# Patient Record
Sex: MALE | Race: WHITE | Employment: OTHER | URBAN - METROPOLITAN AREA
[De-identification: names, ages, dates, MRNs, and addresses within clinical notes are randomized per-mention and may not be internally consistent; named-entity substitution may affect disease eponyms.]

---

## 2017-01-20 RX ORDER — GABAPENTIN 300 MG/1
CAPSULE ORAL
Qty: 90 CAP | Refills: 1 | Status: SHIPPED | OUTPATIENT
Start: 2017-01-20 | End: 2017-07-15 | Stop reason: SDUPTHER

## 2017-01-30 ENCOUNTER — OFFICE VISIT (OUTPATIENT)
Dept: NEUROLOGY | Age: 67
End: 2017-01-30

## 2017-01-30 VITALS
DIASTOLIC BLOOD PRESSURE: 76 MMHG | TEMPERATURE: 98.5 F | SYSTOLIC BLOOD PRESSURE: 122 MMHG | HEART RATE: 60 BPM | RESPIRATION RATE: 20 BRPM | HEIGHT: 72 IN | BODY MASS INDEX: 33.62 KG/M2 | WEIGHT: 248.2 LBS | OXYGEN SATURATION: 98 %

## 2017-01-30 DIAGNOSIS — G20 PARKINSON DISEASE (HCC): Primary | ICD-10-CM

## 2017-01-30 DIAGNOSIS — E11.40 NEUROPATHY DUE TO TYPE 2 DIABETES MELLITUS (HCC): ICD-10-CM

## 2017-01-30 NOTE — MR AVS SNAPSHOT
Visit Information Date & Time Provider Department Dept. Phone Encounter #  
 1/30/2017  3:00 PM Ml Wilkerson MD Neurology [facility] [phone] [id number] Follow-up Instructions Return in about 6 months (around 7/30/2017). Upcoming Health Maintenance Date Due Hepatitis C Screening 1950 HEMOGLOBIN A1C Q6M 1948 LIPID PANEL Q1 1948 FOOT EXAM Q1 7/19/1958 MICROALBUMIN Q1 7/19/1958 EYE EXAM RETINAL OR DILATED Q1 7/19/1958 DTaP/Tdap/Td series (1 - Tdap) 7/19/1969 FOBT Q 1 YEAR AGE 50-75 7/19/1998 ZOSTER VACCINE AGE 60> 7/19/2008 GLAUCOMA SCREENING Q2Y 7/19/2013 Pneumococcal 65+ Low/Medium Risk (1 of 2 - PCV13) 7/19/2013 MEDICARE YEARLY EXAM 7/19/2013 INFLUENZA AGE 9 TO ADULT 8/1/2016 Allergies as of 1/30/2017  Review Complete On: 1/30/2017 By: Ml Wilkerson MD  
 No Known Allergies Current Immunizations  Never Reviewed No immunizations on file. Not reviewed this visit You Were Diagnosed With   
  
 Codes Comments [facility] disease [facility]    -  Primary ICD-10-CM: G20 
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  
 33.66 kg/m 2 2.39 m 2 Preferred Pharmacy Pharmacy Name Phone [id number] [phone] , [id number] [place] [address]  [phone] Your Updated Medication List  
  
   
This list is accurate as of: 1/30/17  3:43 PM.  Always use your most recent med list.  
  
  
  
  
 aspirin delayed-release 81 mg tablet Take 81 mg by mouth daily. atenolol 50 mg tablet Commonly known as:  TENORMIN Take 50 mg by mouth daily. BENADRYL 25 mg capsule Generic drug:  diphenhydrAMINE Take 25 mg by mouth daily (with lunch). carbidopa-levodopa  mg per tablet Commonly known as:  SINEMET  
TAKE 1 TABLET BY MOUTH 4 TIMES A DAY  
  
 fluticasone 50 mcg/actuation nasal spray Commonly known as:  Elihue Caller 2 Sprays by Both Nostrils route daily. gabapentin 300 mg capsule Commonly known as:  NEURONTIN  
TAKE ONE CAPSULE BY MOUTH AT BEDTIME  
  
 glipiZIDE 5 mg tablet Commonly known as:  Selestine Gibbs Take 5 mg by mouth Daily (before breakfast). LIPITOR 40 mg tablet Generic drug:  atorvastatin Take 40 mg by mouth daily (with dinner). metFORMIN 500 mg tablet Commonly known as:  GLUCOPHAGE Take 500 mg by mouth three (3) times daily (with meals). MOBIC 15 mg tablet Generic drug:  meloxicam  
Take 15 mg by mouth daily. multivits,Stress Formula-Zinc tablet Take 1 Tab by mouth daily. OSTEO BI-FLEX PO Take 1 Tab by mouth daily. PREVALITE 4 gram packet Generic drug:  cholestyramine light Dissolve one packet in 2-6 ounces of water or juice daily PriLOSEC OTC 20 mg tablet Generic drug:  omeprazole Take 20 mg by mouth daily. VITAMIN D2 50,000 unit capsule Generic drug:  ergocalciferol Take 50,000 Units by mouth. Takes on Friday XARELTO 20 mg Tab tablet Generic drug:  rivaroxaban  
20 mg daily (with dinner). Take one tablet by mouth daily  Indications: DEEP VENOUS THROMBOSIS Follow-up Instructions Return in about 6 months (around 7/30/2017). Patient Instructions PRESCRIPTION REFILL POLICY [facility] Statement to Patients April 1, 2014 In an effort to ensure the large volume of patient prescription refills is processed in the most efficient and expeditious manner, we are asking our patients to assist us by calling your Pharmacy for all prescription refills, this will include also your  Mail Order Pharmacy. The pharmacy will contact our office electronically to continue the refill process. Please do not wait until the last minute to call your pharmacy. We need at least 48 hours (2days) to fill prescriptions. We also encourage you to call your pharmacy before going to  your prescription to make sure it is ready. With regard to controlled substance prescription refill requests (narcotic refills) that need to be picked up at our office, we ask your cooperation by providing us with at least 72 hours (3days) notice that you will need a refill. We will not refill narcotic prescription refill requests after 4:00pm on any weekday, Monday through Thursday, or after 2:00pm on Fridays, or on the weekends. We encourage everyone to explore another way of getting your prescription refill request processed using [employer], our patient web portal through our electronic medical record system. [employer] is an efficient and effective way to communicate your medication request directly to the office and  downloadable as an jude on your smart phone . [employer] also features a review functionality that allows you to view your medication list as well as leave messages for your physician. Are you ready to get connected? If so please review the attatched instructions or speak to any of our staff to get you set up right away! Thank you so much for your cooperation. Should you have any questions please contact our Practice Administrator. The Physicians and Staff,  [facility] Ofelia Mccloud [address] What is a living will? A living will is a legal form you use to write down the kind of care you want at the end of your life. It is used by the health professionals who will treat you if you aren't able to decide for yourself. If you put your wishes in writing, your loved ones and others will know what kind of care you want. They won't need to guess. This can ease your mind and be helpful to others. A living will is not the same as an estate or property will. An estate will explains what you want to happen with your money and property after you die. Is a living will a legal document? A living will is a legal document. Each state has its own laws about living medrano. If you move to another state, make sure that your living will is legal in the state where you now live. Or you might use a universal form that has been approved by many states. This kind of form can sometimes be completed and stored online. Your electronic copy will then be available wherever you have a connection to the Internet. In most cases, doctors will respect your wishes even if you have a form from a different state. · You don't need an  to complete a living will. But legal advice can be helpful if your state's laws are unclear, your health history is complicated, or your family can't agree on what should be in your living will. · You can change your living will at any time. Some people find that their wishes about end-of-life care change as their health changes. · In addition to making a living will, think about completing a medical power of  form. This form lets you name the person you want to make end-of-life treatment decisions for you (your \"health care agent\") if you're not able to. Many hospitals and nursing homes will give you the forms you need to complete a living will and a medical power of . · Your living will is used only if you can't make or communicate decisions for yourself anymore. If you become able to make decisions again, you can accept or refuse any treatment, no matter what you wrote in your living will. · Your state may offer an online registry.  This is a place where you can store your living will online so the doctors and nurses who need to treat you can find it right away. What should you think about when creating a living will? Talk about your end-of-life wishes with your family members and your doctor. Let them know what you want. That way the people making decisions for you won't be surprised by your choices. Think about these questions as you make your living will: · Do you know enough about life support methods that might be used? If not, talk to your doctor so you know what might be done if you can't breathe on your own, your heart stops, or you're unable to swallow. · What things would you still want to be able to do after you receive life-support methods? Would you want to be able to walk? To speak? To eat on your own? To live without the help of machines? · If you have a choice, where do you want to be cared for? In your home? At a hospital or nursing home? · Do you want certain Yazidism practices performed if you become very ill? · If you have a choice at the end of your life, where would you prefer to die? At home? In a hospital or nursing home? Somewhere else? · Would you prefer to be buried or cremated? · Do you want your organs to be donated after you die? What should you do with your living will? · Make sure that your family members and your health care agent have copies of your living will. · Give your doctor a copy of your living will to keep in your medical record. If you have more than one doctor, make sure that each one has a copy. · You may want to put a copy of your living will where it can be easily found. Where can you learn more? Go to http://cameron-jaye.info/. Enter Q816 in the search box to learn more about \"Learning About Living [place]. \" Current as of: February 24, 2016 Content Version: 11.1 © 0937-2240 [employer].  Care instructions adapted under license by [address] (which disclaims liability or warranty for this information). If you have questions about a medical condition or this instruction, always ask your healthcare professional. [address] any warranty or liability for your use of this information. Patient appears to be doing great and stay as safely and completely busy as possible. Keep blood sugars as reasonably and safely tight as possible. Revisit 6 months. Introducing [facility]! [place] introduces [employer] patient portal. Now you can access parts of your medical record, email your doctor's office, and request medication refills online. 1. In your internet browser, go to [email or web address] 2. Click on the First Time User? Click Here link in the Sign In box. You will see the New Member Sign Up page. 3. Enter your [employer] Access Code exactly as it appears below. You will not need to use this code after youve completed the sign-up process. If you do not sign up before the expiration date, you must request a new code. · [employer] Access Code: [email or web address] Expires: 4/30/2017  3:36 PM 
 
4. Enter the last four digits of your Social Security Number (xxxx) and Date of Birth (mm/dd/yyyy) as indicated and click Submit. You will be taken to the next sign-up page. 5. Create a [employer] ID. This will be your [employer] login ID and cannot be changed, so think of one that is secure and easy to remember. 6. Create a [employer] password. You can change your password at any time. 7. Enter your Password Reset Question and Answer. This can be used at a later time if you forget your password. 8. Enter your e-mail address. You will receive e-mail notification when new information is available in [address] 9. Click Sign Up. You can now view and download portions of your medical record. 10. Click the Download Summary menu link to download a portable copy of your medical information. If you have questions, please visit the Frequently Asked Questions section of the [employer] website. Remember, [employer] is NOT to be used for urgent needs. For medical emergencies, dial 911. Now available from your iPhone and Android! Please provide this summary of care documentation to your next provider. Your primary care clinician is listed as Lillian Eze. If you have any questions after today's visit, please call [phone].

## 2017-01-30 NOTE — PATIENT INSTRUCTIONS
[facility]   Statement to Patients  April 1, 2014      In an effort to ensure the large volume of patient prescription refills is processed in the most efficient and expeditious manner, we are asking our patients to assist us by calling your Pharmacy for all prescription refills, this will include also your  Mail Order Pharmacy. The pharmacy will contact our office electronically to continue the refill process. Please do not wait until the last minute to call your pharmacy. We need at least 48 hours (2days) to fill prescriptions. We also encourage you to call your pharmacy before going to  your prescription to make sure it is ready. With regard to controlled substance prescription refill requests (narcotic refills) that need to be picked up at our office, we ask your cooperation by providing us with at least 72 hours (3days) notice that you will need a refill. We will not refill narcotic prescription refill requests after 4:00pm on any weekday, Monday through Thursday, or after 2:00pm on Fridays, or on the weekends. We encourage everyone to explore another way of getting your prescription refill request processed using [employer], our patient web portal through our electronic medical record system. [employer] is an efficient and effective way to communicate your medication request directly to the office and  downloadable as an jude on your smart phone . [employer] also features a review functionality that allows you to view your medication list as well as leave messages for your physician. Are you ready to get connected? If so please review the attatched instructions or speak to any of our staff to get you set up right away! Thank you so much for your cooperation. Should you have any questions please contact our Practice Administrator.     The Physicians and Staff,  [facility] [address]  What is a living will? A living will is a legal form you use to write down the kind of care you want at the end of your life. It is used by the health professionals who will treat you if you aren't able to decide for yourself. If you put your wishes in writing, your loved ones and others will know what kind of care you want. They won't need to guess. This can ease your mind and be helpful to others. A living will is not the same as an estate or property will. An estate will explains what you want to happen with your money and property after you die. Is a living will a legal document? A living will is a legal document. Each state has its own laws about living medrano. If you move to another state, make sure that your living will is legal in the state where you now live. Or you might use a universal form that has been approved by many states. This kind of form can sometimes be completed and stored online. Your electronic copy will then be available wherever you have a connection to the Internet. In most cases, doctors will respect your wishes even if you have a form from a different state. · You don't need an  to complete a living will. But legal advice can be helpful if your state's laws are unclear, your health history is complicated, or your family can't agree on what should be in your living will. · You can change your living will at any time. Some people find that their wishes about end-of-life care change as their health changes. · In addition to making a living will, think about completing a medical power of  form. This form lets you name the person you want to make end-of-life treatment decisions for you (your \"health care agent\") if you're not able to. Many hospitals and nursing homes will give you the forms you need to complete a living will and a medical power of . · Your living will is used only if you can't make or communicate decisions for yourself anymore.  If you become able to make decisions again, you can accept or refuse any treatment, no matter what you wrote in your living will. · Your state may offer an online registry. This is a place where you can store your living will online so the doctors and nurses who need to treat you can find it right away. What should you think about when creating a living will? Talk about your end-of-life wishes with your family members and your doctor. Let them know what you want. That way the people making decisions for you won't be surprised by your choices. Think about these questions as you make your living will:  · Do you know enough about life support methods that might be used? If not, talk to your doctor so you know what might be done if you can't breathe on your own, your heart stops, or you're unable to swallow. · What things would you still want to be able to do after you receive life-support methods? Would you want to be able to walk? To speak? To eat on your own? To live without the help of machines? · If you have a choice, where do you want to be cared for? In your home? At a hospital or nursing home? · Do you want certain Mandaen practices performed if you become very ill? · If you have a choice at the end of your life, where would you prefer to die? At home? In a hospital or nursing home? Somewhere else? · Would you prefer to be buried or cremated? · Do you want your organs to be donated after you die? What should you do with your living will? · Make sure that your family members and your health care agent have copies of your living will. · Give your doctor a copy of your living will to keep in your medical record. If you have more than one doctor, make sure that each one has a copy. · You may want to put a copy of your living will where it can be easily found. Where can you learn more? Go to http://cameron-jaye.info/. Enter Q400 in the search box to learn more about \"Learning About Living [place]. \"  Current as of: February 24, 2016  Content Version: 11.1  © 8421-4273 [employer], [employer]. Care instructions adapted under license by [employer] (which disclaims liability or warranty for this information). If you have questions about a medical condition or this instruction, always ask your healthcare professional. [address] any warranty or liability for your use of this information. Patient appears to be doing great and stay as safely and completely busy as possible. Keep blood sugars as reasonably and safely tight as possible. Revisit 6 months.

## 2017-01-30 NOTE — PROGRESS NOTES
Neurology Consult      Subjective:      Wong Bennett is a 74 y.o. male who returns with Parkinson's disease and painful diabetic neuropathy. He is doing fantastic. On Sinemet immediate release 25/100 4 times daily. Sometimes forgets to take the midafternoon dose. Went over the idea of purchasing a cheap digital watch with alarm function to keep that timeframe in perspective. He likes to dabble in his garage and does repairs and other woodworking chores. Suggested he may be able to dampen the tremor in his hands by placing weights around the wrist to dampen the amplitude of the tremors. He uses simple gabapentin 300 mg nightly for control of breakout painful diabetic neuropathy. Interestingly, he forgot his capsule one night and found he had eruptive pain in his feet the next morning. Says his last hemoglobin  A1C  was in the 7 range. Says he could always do better and I will leave that up between him and the doctor managing his sugars. Does not mention anything new today. Apparently has a generic carbidopa levodopa preparation that is much easier to take and easy on his stomach. Normally has to take something to challenge the stomach upset. Encouraged him to stay busy and I will see him in 6 months. Current Outpatient Prescriptions   Medication Sig Dispense Refill    gabapentin (NEURONTIN) 300 mg capsule TAKE ONE CAPSULE BY MOUTH AT BEDTIME 90 Cap 1    carbidopa-levodopa (SINEMET)  mg per tablet TAKE 1 TABLET BY MOUTH 4 TIMES A  Tab 1    glipiZIDE (GLUCOTROL) 5 mg tablet Take 5 mg by mouth Daily (before breakfast).  metFORMIN (GLUCOPHAGE) 500 mg tablet Take 500 mg by mouth three (3) times daily (with meals).  fluticasone (FLONASE) 50 mcg/actuation nasal spray 2 Sprays by Both Nostrils route daily.  XARELTO 20 mg tab tablet 20 mg daily (with dinner).  Take one tablet by mouth daily  Indications: DEEP VENOUS THROMBOSIS  4    PREVALITE 4 gram packet Dissolve one packet in 2-6 ounces of water or juice daily  11    diphenhydrAMINE (BENADRYL) 25 mg capsule Take 25 mg by mouth daily (with lunch).  meloxicam (MOBIC) 15 mg tablet Take 15 mg by mouth daily.  GLUCOSAMINE HCL/CHONDR CARVER A NA (OSTEO BI-FLEX PO) Take 1 Tab by mouth daily.  ergocalciferol (VITAMIN D2) 50,000 unit capsule Take 50,000 Units by mouth. Takes on Friday      atorvastatin (LIPITOR) 40 mg tablet Take 40 mg by mouth daily (with dinner).  multivits,Stress Formula-Zinc tablet Take 1 Tab by mouth daily.  atenolol (TENORMIN) 50 mg tablet Take 50 mg by mouth daily.  aspirin delayed-release 81 mg tablet Take 81 mg by mouth daily.  omeprazole (PRILOSEC OTC) 20 mg tablet Take 20 mg by mouth daily. 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    Thromboembolus ([address] 2006     Right calf DVT      Past Surgical History   Procedure Laterality Date    Hx heent       bilateral cataract with IOL    Pr cardiac surg procedure unlist  2001,2009     pace maker    Pr right heart catheterization  2000,2001     stents x2    Hx pacemaker       see card on paper medical record    Hx orthopaedic  2010     right rotator cuff repair    Hx mohs procedure Left 2012    Hx hernia repair Left 1968     inguinal    Hx knee arthroscopy Right 2016      Social History     Social History    Marital status:      Spouse name: N/A    Number of children: N/A    Years of education: N/A     Occupational History    Not on file.      Social History Main Topics    Smoking status: Former Smoker     Packs/day: 0.50     Years: 30.00     Quit date: 1/7/1995    Smokeless tobacco: Never Used    Alcohol use 2.4 oz/week     2 Glasses of wine, 1 Cans of beer, 1 Shots of liquor per week    Drug use: No    Sexual activity: Not on file     Other Topics Concern    Not on file     Social History Narrative      Family History   Problem Relation Age of Onset    No Known Problems Mother     Heart Disease Father     No Known Problems Sister       Visit Vitals    /76    Pulse 60    Temp 98.5 °F (36.9 °C)    Resp 20    Ht 6' (1.829 m)    Wt 112.6 kg (248 lb 3.2 oz)    SpO2 98%    BMI 33.66 kg/m2        Review of Systems:   A comprehensive review of systems was negative except for that written in the HPI. Neuro Exam:     Appearance: The patient is well developed, well nourished, provides a coherent history and is in no acute distress. Mental Status: Oriented to time, place and person. Mood and affect appropriate. Cranial Nerves:   Intact visual fields. Fundi are benign. MARGIE, EOM's full, no nystagmus, no ptosis. Facial sensation is normal. Corneal reflexes are intact. Facial movement is symmetric. Hearing is normal bilaterally. Palate is midline with normal sternocleidomastoid and trapezius muscles are normal. Tongue is midline. Motor:  5/5 strength in upper and lower proximal and distal muscles. Normal bulk and tone. No fasciculations. Reflexes:   Deep tendon reflexes 0-1 except a subtle +/4 and symmetrical.   Sensory:    diminished distally to touch, pinprick and vibration. Gait:  Normal gait. Tremor:   No tremor noted except a mild postural and kinetic tremor of the hands when he performed something intricate . Cerebellar:  No cerebellar signs present. Neurovascular:  Normal heart sounds and regular rhythm, peripheral pulses intact, and no carotid bruits. Assessment:   Parkinson's disease.   He looks great and will ask to continue the same Sinemet dosing as before. If the midafternoon dose is hard to remember he can simply purchase a cheap digital watch with alarm function to keep him posted. Stay as reasonably and safely busy as possible. Painful diabetic neuropathy. Currently getting by with gabapentin in the evening. We have plenty of room and options to place in the daytime if and when we need to. Again try to keep blood sugars as reasonably and tightly controlled as possible. Plan:   Revisit 6 months.   Signed by :  Lenord Fabry MD

## 2017-03-01 RX ORDER — CARBIDOPA AND LEVODOPA 25; 100 MG/1; MG/1
TABLET ORAL
Qty: 360 TAB | Refills: 1 | Status: SHIPPED | OUTPATIENT
Start: 2017-03-01 | End: 2017-08-30 | Stop reason: SDUPTHER

## 2017-07-15 RX ORDER — GABAPENTIN 300 MG/1
CAPSULE ORAL
Qty: 90 CAP | Refills: 1 | Status: SHIPPED | OUTPATIENT
Start: 2017-07-15 | End: 2017-10-10 | Stop reason: SDUPTHER

## 2017-08-16 ENCOUNTER — OFFICE VISIT (OUTPATIENT)
Dept: NEUROLOGY | Age: 67
End: 2017-08-16

## 2017-08-16 VITALS
DIASTOLIC BLOOD PRESSURE: 70 MMHG | BODY MASS INDEX: 33.18 KG/M2 | SYSTOLIC BLOOD PRESSURE: 120 MMHG | RESPIRATION RATE: 20 BRPM | WEIGHT: 245 LBS | HEIGHT: 72 IN

## 2017-08-16 DIAGNOSIS — E11.40 NEUROPATHY DUE TO TYPE 2 DIABETES MELLITUS (HCC): Primary | ICD-10-CM

## 2017-08-16 DIAGNOSIS — G20 PARKINSON DISEASE (HCC): ICD-10-CM

## 2017-08-16 NOTE — MR AVS SNAPSHOT
Visit Information Date & Time Provider Department Dept. Phone Encounter #  
 8/16/2017  1:40 PM MD Juli Monteiro [facility] Neurology [facility] [phone] [id number] Follow-up Instructions Return in about 6 months (around 2/16/2018). Upcoming Health Maintenance Date Due Hepatitis C Screening 1950 HEMOGLOBIN A1C Q6M 1950 LIPID PANEL Q1 1950 FOOT EXAM Q1 7/19/1958 MICROALBUMIN Q1 7/19/1958 EYE EXAM RETINAL OR DILATED Q1 7/19/1958 DTaP/Tdap/Td series (1 - Tdap) 7/19/1969 FOBT Q 1 YEAR AGE 50-75 7/19/1998 ZOSTER VACCINE AGE 60> 5/19/2008 GLAUCOMA SCREENING Q2Y 7/19/2013 Pneumococcal 65+ Low/Medium Risk (1 of 2 - PCV13) 7/19/2013 MEDICARE YEARLY EXAM 7/19/2013 INFLUENZA AGE 9 TO ADULT 8/1/2017 Allergies as of 8/16/2017  Review Complete On: 1/30/2017 By: Maci Castro MD  
 No Known Allergies Current Immunizations  Never Reviewed No immunizations on file. Not reviewed this visit You Were Diagnosed With   
  
 Codes Comments Neuropathy due to type 2 diabetes mellitus ([facility]    -  Primary ICD-10-CM: E11.40 ICD-9-CM: 250.60, 357.2 Parkinson disease ([facility]     ICD-10-CM: G20 
ICD-9-CM: 332.0 Vitals BP Resp Height(growth percentile) Weight(growth percentile) BMI Smoking Status 120/70 20 6' (1.829 m) 245 lb (111.1 kg) 33.23 kg/m2 Former Smoker Vitals History BMI and BSA Data Body Mass Index Body Surface Area  
 33.23 kg/m 2 2.38 m 2 Preferred Pharmacy Pharmacy Name Phone [phone], [address] [place] [address]  [phone] Your Updated Medication List  
  
   
This list is accurate as of: 8/16/17  1:50 PM.  Always use your most recent med list.  
  
  
  
  
 aspirin delayed-release 81 mg tablet Take 81 mg by mouth daily. atenolol 50 mg tablet Commonly known as:  TENORMIN Take 50 mg by mouth daily. BENADRYL 25 mg capsule Generic drug:  diphenhydrAMINE Take 25 mg by mouth daily (with lunch). carbidopa-levodopa  mg per tablet Commonly known as:  SINEMET  
TAKE 1 TABLET BY MOUTH 4 TIMES A DAY  
  
 fluticasone 50 mcg/actuation nasal spray Commonly known as:  [place] [facility] 2 Sprays by Both Nostrils route daily. gabapentin 300 mg capsule Commonly known as:  NEURONTIN  
TAKE ONE CAPSULE BY MOUTH AT BEDTIME  
  
 glipiZIDE 5 mg tablet Commonly known as:  Jaja Stade Take 5 mg by mouth Daily (before breakfast). LIPITOR 40 mg tablet Generic drug:  atorvastatin Take 40 mg by mouth daily (with dinner). metFORMIN 500 mg tablet Commonly known as:  GLUCOPHAGE Take 500 mg by mouth three (3) times daily (with meals). MOBIC 15 mg tablet Generic drug:  meloxicam  
Take 15 mg by mouth daily. multivits,Stress Formula-Zinc tablet Take 1 Tab by mouth daily. OSTEO BI-FLEX PO Take 1 Tab by mouth daily. PREVALITE 4 gram packet Generic drug:  cholestyramine-aspartame Dissolve one packet in 2-6 ounces of water or juice daily PriLOSEC OTC 20 mg tablet Generic drug:  omeprazole Take 20 mg by mouth daily. SINEX REGULAR NA  
by Nasal route. VITAMIN D2 50,000 unit capsule Generic drug:  ergocalciferol Take 50,000 Units by mouth. Takes on Friday XARELTO 20 mg Tab tablet Generic drug:  rivaroxaban  
20 mg daily (with dinner). Take one tablet by mouth daily  Indications: DEEP VENOUS THROMBOSIS Follow-up Instructions Return in about 6 months (around 2/16/2018). Patient Instructions Ofelia Mccloud 1721 What is a living will? A living will is a legal form you use to write down the kind of care you want at the end of your life. It is used by the health professionals who will treat you if you aren't able to decide for yourself. If you put your wishes in writing, your loved ones and others will know what kind of care you want. They won't need to guess. This can ease your mind and be helpful to others. A living will is not the same as an estate or property will. An estate will explains what you want to happen with your money and property after you die. Is a living will a legal document? A living will is a legal document. Each state has its own laws about living medrano. If you move to another state, make sure that your living will is legal in the state where you now live. Or you might use a universal form that has been approved by many states. This kind of form can sometimes be completed and stored online. Your electronic copy will then be available wherever you have a connection to the Internet. In most cases, doctors will respect your wishes even if you have a form from a different state. · You don't need an  to complete a living will. But legal advice can be helpful if your state's laws are unclear, your health history is complicated, or your family can't agree on what should be in your living will. · You can change your living will at any time. Some people find that their wishes about end-of-life care change as their health changes. · In addition to making a living will, think about completing a medical power of  form. This form lets you name the person you want to make end-of-life treatment decisions for you (your \"health care agent\") if you're not able to. Many hospitals and nursing homes will give you the forms you need to complete a living will and a medical power of . · Your living will is used only if you can't make or communicate decisions for yourself anymore. If you become able to make decisions again, you can accept or refuse any treatment, no matter what you wrote in your living will. · Your state may offer an online registry.  This is a place where you can store your living will online so the doctors and nurses who need to treat you can find it right away. What should you think about when creating a living will? Talk about your end-of-life wishes with your family members and your doctor. Let them know what you want. That way the people making decisions for you won't be surprised by your choices. Think about these questions as you make your living will: · Do you know enough about life support methods that might be used? If not, talk to your doctor so you know what might be done if you can't breathe on your own, your heart stops, or you're unable to swallow. · What things would you still want to be able to do after you receive life-support methods? Would you want to be able to walk? To speak? To eat on your own? To live without the help of machines? · If you have a choice, where do you want to be cared for? In your home? At a hospital or nursing home? · Do you want certain Druze practices performed if you become very ill? · If you have a choice at the end of your life, where would you prefer to die? At home? In a hospital or nursing home? Somewhere else? · Would you prefer to be buried or cremated? · Do you want your organs to be donated after you die? What should you do with your living will? · Make sure that your family members and your health care agent have copies of your living will. · Give your doctor a copy of your living will to keep in your medical record. If you have more than one doctor, make sure that each one has a copy. · You may want to put a copy of your living will where it can be easily found. Where can you learn more? Go to http://cameron-jaye.info/. Enter S291 in the search box to learn more about \"Learning About Living [employer]. \" Current as of: August 8, 2016 Content Version: 11.3 © 5348-5855 [employer].  Care instructions adapted under license by [address] (which disclaims liability or warranty for this information). If you have questions about a medical condition or this instruction, always ask your healthcare professional. [address] any warranty or liability for your use of this information. Advance Directives: Care Instructions Your Care Instructions An advance directive is a legal way to state your wishes at the end of your life. It tells your family and your doctor what to do if you can no longer say what you want. There are two main types of advance directives. You can change them any time that your wishes change. · A living will tells your family and your doctor your wishes about life support and other treatment. · A durable power of  for health care lets you name a person to make treatment decisions for you when you can't speak for yourself. This person is called a health care agent. If you do not have an advance directive, decisions about your medical care may be made by a doctor or a  who doesn't know you. It may help to think of an advance directive as a gift to the people who care for you. If you have one, they won't have to make tough decisions by themselves. Follow-up care is a key part of your treatment and safety. Be sure to make and go to all appointments, and call your doctor if you are having problems. It's also a good idea to know your test results and keep a list of the medicines you take. How can you care for yourself at home? · Discuss your wishes with your loved ones and your doctor. This way, there are no surprises. · Many states have a unique form. Or you might use a universal form that has been approved by many states. This kind of form can sometimes be completed and stored online. Your electronic copy will then be available wherever you have a connection to the Internet.  In most cases, doctors will respect your wishes even if you have a form from a different state. · You don't need a  to do an advance directive. But you may want to get legal advice. · Think about these questions when you prepare an advance directive: ¨ Who do you want to make decisions about your medical care if you are not able to? Many people choose a family member or close friend. ¨ Do you know enough about life support methods that might be used? If not, talk to your doctor so you understand. ¨ What are you most afraid of that might happen? You might be afraid of having pain, losing your independence, or being kept alive by machines. ¨ Where would you prefer to die? Choices include your home, a hospital, or a nursing home. ¨ Would you like to have information about hospice care to support you and your family? ¨ Do you want to donate organs when you die? ¨ Do you want certain Mandaen practices performed before you die? If so, put your wishes in the advance directive. · Read your advance directive every year, and make changes as needed. When should you call for help? Be sure to contact your doctor if you have any questions. Where can you learn more? Go to http://cameron-jaye.info/. Enter R264 in the search box to learn more about \"Advance Directives: Care Instructions. \" Current as of: November 17, 2016 Content Version: 11.3 © 8085-6505 [employer]. Care instructions adapted under license by [employer] (which disclaims liability or warranty for this information). If you have questions about a medical condition or this instruction, always ask your healthcare professional. [place] [address] any warranty or liability for your use of this information. PRESCRIPTION REFILL POLICY [facility] Statement to Patients April 1, 2014 In an effort to ensure the large volume of patient prescription refills is processed in the most efficient and expeditious manner, we are asking our patients to assist us by calling your Pharmacy for all prescription refills, this will include also your  Mail Order Pharmacy. The pharmacy will contact our office electronically to continue the refill process. Please do not wait until the last minute to call your pharmacy. We need at least 48 hours (2days) to fill prescriptions. We also encourage you to call your pharmacy before going to  your prescription to make sure it is ready. With regard to controlled substance prescription refill requests (narcotic refills) that need to be picked up at our office, we ask your cooperation by providing us with at least 72 hours (3days) notice that you will need a refill. We will not refill narcotic prescription refill requests after 4:00pm on any weekday, Monday through Thursday, or after 2:00pm on Fridays, or on the weekends. We encourage everyone to explore another way of getting your prescription refill request processed using [employer], our patient web portal through our electronic medical record system. [employer] is an efficient and effective way to communicate your medication request directly to the office and  downloadable as an jude on your smart phone . [employer] also features a review functionality that allows you to view your medication list as well as leave messages for your physician. Are you ready to get connected? If so please review the attatched instructions or speak to any of our staff to get you set up right away! Thank you so much for your cooperation. Should you have any questions please contact our Practice Administrator. The Physicians and Staff,  [facility] Introducing [facility] SERVICES! [facility] introduces [employer] patient portal. Now you can access parts of your medical record, email your doctor's office, and request medication refills online.    
 
1. In your internet browser, go to [email or web address] 2. Click on the First Time User? Click Here link in the Sign In box. You will see the New Member Sign Up page. 3. Enter your Sinch Access Code exactly as it appears below. You will not need to use this code after youve completed the sign-up process. If you do not sign up before the expiration date, you must request a new code. · Sinch Access Code: [email or web address] Expires: 11/14/2017  1:22 PM 
 
4. Enter the last four digits of your Social Security Number (xxxx) and Date of Birth (mm/dd/yyyy) as indicated and click Submit. You will be taken to the next sign-up page. 5. Create a Sinch ID. This will be your Sinch login ID and cannot be changed, so think of one that is secure and easy to remember. 6. Create a Sinch password. You can change your password at any time. 7. Enter your Password Reset Question and Answer. This can be used at a later time if you forget your password. 8. Enter your e-mail address. You will receive e-mail notification when new information is available in [address] 9. Click Sign Up. You can now view and download portions of your medical record. 10. Click the Download Summary menu link to download a portable copy of your medical information. If you have questions, please visit the Frequently Asked Questions section of the Sinch website. Remember, Sinch is NOT to be used for urgent needs. For medical emergencies, dial 911. Now available from your iPhone and Android! Please provide this summary of care documentation to your next provider. Your primary care clinician is listed as Axel Escudero. If you have any questions after today's visit, please call [phone].

## 2017-08-16 NOTE — PATIENT INSTRUCTIONS
Learning About Living Lilian Simon  What is a living will? A living will is a legal form you use to write down the kind of care you want at the end of your life. It is used by the health professionals who will treat you if you aren't able to decide for yourself. If you put your wishes in writing, your loved ones and others will know what kind of care you want. They won't need to guess. This can ease your mind and be helpful to others. A living will is not the same as an estate or property will. An estate will explains what you want to happen with your money and property after you die. Is a living will a legal document? A living will is a legal document. Each state has its own laws about living medrano. If you move to another state, make sure that your living will is legal in the state where you now live. Or you might use a universal form that has been approved by many states. This kind of form can sometimes be completed and stored online. Your electronic copy will then be available wherever you have a connection to the Internet. In most cases, doctors will respect your wishes even if you have a form from a different state. · You don't need an  to complete a living will. But legal advice can be helpful if your state's laws are unclear, your health history is complicated, or your family can't agree on what should be in your living will. · You can change your living will at any time. Some people find that their wishes about end-of-life care change as their health changes. · In addition to making a living will, think about completing a medical power of  form. This form lets you name the person you want to make end-of-life treatment decisions for you (your \"health care agent\") if you're not able to. Many hospitals and nursing homes will give you the forms you need to complete a living will and a medical power of .   · Your living will is used only if you can't make or communicate decisions for yourself anymore. If you become able to make decisions again, you can accept or refuse any treatment, no matter what you wrote in your living will. · Your state may offer an online registry. This is a place where you can store your living will online so the doctors and nurses who need to treat you can find it right away. What should you think about when creating a living will? Talk about your end-of-life wishes with your family members and your doctor. Let them know what you want. That way the people making decisions for you won't be surprised by your choices. Think about these questions as you make your living will:  · Do you know enough about life support methods that might be used? If not, talk to your doctor so you know what might be done if you can't breathe on your own, your heart stops, or you're unable to swallow. · What things would you still want to be able to do after you receive life-support methods? Would you want to be able to walk? To speak? To eat on your own? To live without the help of machines? · If you have a choice, where do you want to be cared for? In your home? At a hospital or nursing home? · Do you want certain Jehovah's witness practices performed if you become very ill? · If you have a choice at the end of your life, where would you prefer to die? At home? In a hospital or nursing home? Somewhere else? · Would you prefer to be buried or cremated? · Do you want your organs to be donated after you die? What should you do with your living will? · Make sure that your family members and your health care agent have copies of your living will. · Give your doctor a copy of your living will to keep in your medical record. If you have more than one doctor, make sure that each one has a copy. · You may want to put a copy of your living will where it can be easily found. Where can you learn more? Go to http://cameron-jaye.info/.   Enter S497 in the search box to learn more about \"Learning About Living Obdulio. \"  Current as of: August 8, 2016  Content Version: 11.3  © 5395-1664 [employer]. Care instructions adapted under license by [employer] (which disclaims liability or warranty for this information). If you have questions about a medical condition or this instruction, always ask your healthcare professional. [address] any warranty or liability for your use of this information. Advance Directives: Care Instructions  Your Care Instructions  An advance directive is a legal way to state your wishes at the end of your life. It tells your family and your doctor what to do if you can no longer say what you want. There are two main types of advance directives. You can change them any time that your wishes change. · A living will tells your family and your doctor your wishes about life support and other treatment. · A durable power of  for health care lets you name a person to make treatment decisions for you when you can't speak for yourself. This person is called a health care agent. If you do not have an advance directive, decisions about your medical care may be made by a doctor or a  who doesn't know you. It may help to think of an advance directive as a gift to the people who care for you. If you have one, they won't have to make tough decisions by themselves. Follow-up care is a key part of your treatment and safety. Be sure to make and go to all appointments, and call your doctor if you are having problems. It's also a good idea to know your test results and keep a list of the medicines you take. How can you care for yourself at home? · Discuss your wishes with your loved ones and your doctor. This way, there are no surprises. · Many states have a unique form. Or you might use a universal form that has been approved by many states. This kind of form can sometimes be completed and stored online.  Your electronic copy will then be available wherever you have a connection to the Internet. In most cases, doctors will respect your wishes even if you have a form from a different state. · You don't need a  to do an advance directive. But you may want to get legal advice. · Think about these questions when you prepare an advance directive:  ¨ Who do you want to make decisions about your medical care if you are not able to? Many people choose a family member or close friend. ¨ Do you know enough about life support methods that might be used? If not, talk to your doctor so you understand. ¨ What are you most afraid of that might happen? You might be afraid of having pain, losing your independence, or being kept alive by machines. ¨ Where would you prefer to die? Choices include your home, a hospital, or a nursing home. ¨ Would you like to have information about hospice care to support you and your family? ¨ Do you want to donate organs when you die? ¨ Do you want certain [employer] practices performed before you die? If so, put your wishes in the advance directive. · Read your advance directive every year, and make changes as needed. When should you call for help? Be sure to contact your doctor if you have any questions. Where can you learn more? Go to http://cameron-jaye.info/. Enter R264 in the search box to learn more about \"Advance Directives: Care Instructions. \"  Current as of: November 17, 2016  Content Version: 11.3  © 9390-3952 [employer]. Care instructions adapted under license by [employer] (which disclaims liability or warranty for this information). If you have questions about a medical condition or this instruction, always ask your healthcare professional. [place] [address] any warranty or liability for your use of this information.   10 [facility]   Statement to Patients  April 1, 2014      In an effort to ensure the large volume of patient prescription refills is processed in the most efficient and expeditious manner, we are asking our patients to assist us by calling your Pharmacy for all prescription refills, this will include also your  Mail Order Pharmacy. The pharmacy will contact our office electronically to continue the refill process. Please do not wait until the last minute to call your pharmacy. We need at least 48 hours (2days) to fill prescriptions. We also encourage you to call your pharmacy before going to  your prescription to make sure it is ready. With regard to controlled substance prescription refill requests (narcotic refills) that need to be picked up at our office, we ask your cooperation by providing us with at least 72 hours (3days) notice that you will need a refill. We will not refill narcotic prescription refill requests after 4:00pm on any weekday, Monday through Thursday, or after 2:00pm on Fridays, or on the weekends. We encourage everyone to explore another way of getting your prescription refill request processed using [employer], our patient web portal through our electronic medical record system. [employer] is an efficient and effective way to communicate your medication request directly to the office and  downloadable as an jude on your smart phone . [employer] also features a review functionality that allows you to view your medication list as well as leave messages for your physician. Are you ready to get connected? If so please review the attatched instructions or speak to any of our staff to get you set up right away! Thank you so much for your cooperation. Should you have any questions please contact our Practice Administrator.     The Physicians and Staff,  [facility]   Patient is doing reasonably well but encouraged him to be cautious on his walking and challenging circumstances such as turns uneven surfaces when it is dark etc. patient will check the dosing of gabapentin at night and let me know how that works for his neuropathy pain. We can adjust the dose accordingly. Continue revisit 6 months. Sinemet as is and stay as safely and reasonably busy as possible.

## 2017-08-16 NOTE — PROGRESS NOTES
Neurology Consult      Subjective:      Candi Holloway is a 69 y.o. male Who returns with Parkinson's disease and painful diabetic neuropathy. Is on Sinemet 25 100 4 times daily and is more on spot with taking all doses. Seems to tolerate the medicine well although has found he cannot stomach the medicine without food. Also has painful diabetic neuropathy expressed at night. Gabapentin 300 mg before sleep seems to help although does get occasional breakthrough sharp tingling discomfort. Is welcome to double the dose and see if it works and is otherwise tolerated. If so we could reconfigure his nighttime dose accordingly but if there are issues and the dose is too high we can taper it down to 400 mg or so and work it accordingly. Has astutely noted that when he takes turns he may find brief off centered feeling. I reminded him of his 2 disease category and how that is linked to transfer and gait performance. He will take his time in anticipation of trouble and exercise his common sense. RV 6 months. He needs to call me back on his night time dosing of gabapentin so I can amend his med. Current Outpatient Prescriptions   Medication Sig Dispense Refill    PHENYLEPHRINE HCL (SINEX REGULAR NA) by Nasal route.  gabapentin (NEURONTIN) 300 mg capsule TAKE ONE CAPSULE BY MOUTH AT BEDTIME 90 Cap 1    carbidopa-levodopa (SINEMET)  mg per tablet TAKE 1 TABLET BY MOUTH 4 TIMES A  Tab 1    glipiZIDE (GLUCOTROL) 5 mg tablet Take 5 mg by mouth Daily (before breakfast).  metFORMIN (GLUCOPHAGE) 500 mg tablet Take 500 mg by mouth three (3) times daily (with meals).  XARELTO 20 mg tab tablet 20 mg daily (with dinner). Take one tablet by mouth daily  Indications: DEEP VENOUS THROMBOSIS  4    PREVALITE 4 gram packet Dissolve one packet in 2-6 ounces of water or juice daily  11    diphenhydrAMINE (BENADRYL) 25 mg capsule Take 25 mg by mouth daily (with lunch).       meloxicam (MOBIC) 15 mg tablet Take 15 mg by mouth daily.  GLUCOSAMINE HCL/CHONDR CARVER A NA (OSTEO BI-FLEX PO) Take 1 Tab by mouth daily.  ergocalciferol (VITAMIN D2) 50,000 unit capsule Take 50,000 Units by mouth. Takes on Friday      atorvastatin (LIPITOR) 40 mg tablet Take 40 mg by mouth daily (with dinner).  multivits,Stress Formula-Zinc tablet Take 1 Tab by mouth daily.  atenolol (TENORMIN) 50 mg tablet Take 50 mg by mouth daily.  aspirin delayed-release 81 mg tablet Take 81 mg by mouth daily.  omeprazole (PRILOSEC OTC) 20 mg tablet Take 20 mg by mouth daily.  fluticasone (FLONASE) 50 mcg/actuation nasal spray 2 Sprays by Both Nostrils route daily. 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    Thromboembolus ([facility] [address] [facility] 2006    Right calf DVT      Past Surgical History:   Procedure Laterality Date    CARDIAC SURG PROCEDURE UNLIST  2001,2009    pace maker    HX HEENT      bilateral cataract with IOL    HX HERNIA REPAIR Left 1968    inguinal    HX KNEE ARTHROSCOPY Right 2016    HX MOHS PROCEDURES Left 2012    HX ORTHOPAEDIC  2010    right rotator cuff repair    HX PACEMAKER      see card on paper medical record   [address]  2000,2001    stents x2      Social History     Social History    Marital status:      Spouse name: N/A    Number of children: N/A    Years of education: N/A     Occupational History    Not on file.      Social History Main Topics    Smoking status: Former Smoker     Packs/day: 0.50     Years: 30.00     Quit date: 1/7/1995    Smokeless tobacco: Never Used    Alcohol use 2.4 oz/week     2 Glasses of wine, 1 Cans of beer, 1 Shots of liquor per week    Drug use: No    Sexual activity: Not on file     Other Topics Concern    Not on file     Social History Narrative      Family History   Problem Relation Age of Onset    No Known Problems Mother     Heart Disease Father     No Known Problems Sister       Visit Vitals    /70    Resp 20    Ht 6' (1.829 m)    Wt 111.1 kg (245 lb)    BMI 33.23 kg/m2        Review of Systems:   A comprehensive review of systems was negative except for that written in the HPI. Neuro Exam:     Appearance: The patient is well developed, well nourished, provides a coherent history and is in no acute distress. Mental Status: Oriented to time, place and person. Mood and affect appropriate. Cranial Nerves:   Intact visual fields. Fundi are benign. MARGIE, EOM's full, no nystagmus, no ptosis. Facial sensation is normal. Corneal reflexes are intact. Facial movement is symmetric. Hearing is normal bilaterally. Palate is midline with normal sternocleidomastoid and trapezius muscles are normal. Tongue is midline. Motor:  5/5 strength in upper and lower proximal and distal muscles. Normal bulk and tone. No fasciculations. Reflexes:   Deep tendon reflexes 0-1+/4 and symmetrical.   Sensory:    Diminished distally to touch, pinprick and vibration. Gait:  Normal gait. Tremor:   No tremor noted. Cerebellar:  No cerebellar signs present. Neurovascular:  Normal heart sounds and regular rhythm, peripheral pulses intact, and no carotid bruits. Assessment:   Problem 1 Parkinson's disease. Continue Sinemet as is and stay as reasonably safe and busy as possible. Encouraged him to be cautious on turns uneven surfaces in the dark as he has diabetic neuropathy which also makes things interesting in terms of gait performance. Problem 2 diabetic painful neuropathy.   Is welcome to try increasing the nighttime dose of gabapentin to 600 mg and see if that is helpful and tolerated. If to overpowering we could dampen the strength to 400 mg or so accordingly. Plan:   Revisit 6 months.   Signed by :  Renold Dakin MD

## 2017-08-30 RX ORDER — CARBIDOPA AND LEVODOPA 25; 100 MG/1; MG/1
TABLET ORAL
Qty: 360 TAB | Refills: 1 | Status: SHIPPED | OUTPATIENT
Start: 2017-08-30 | End: 2018-01-15 | Stop reason: SDUPTHER

## 2017-10-10 ENCOUNTER — TELEPHONE (OUTPATIENT)
Dept: NEUROLOGY | Age: 67
End: 2017-10-10

## 2017-10-10 RX ORDER — GABAPENTIN 300 MG/1
CAPSULE ORAL
Qty: 180 CAP | Refills: 1 | Status: SHIPPED | OUTPATIENT
Start: 2017-10-10 | End: 2018-04-04 | Stop reason: SDUPTHER

## 2018-01-15 RX ORDER — CARBIDOPA AND LEVODOPA 25; 100 MG/1; MG/1
TABLET ORAL
Qty: 360 TAB | Refills: 1 | Status: SHIPPED | OUTPATIENT
Start: 2018-01-15 | End: 2018-09-03 | Stop reason: SDUPTHER

## 2018-02-13 ENCOUNTER — OFFICE VISIT (OUTPATIENT)
Dept: NEUROLOGY | Age: 68
End: 2018-02-13

## 2018-02-13 VITALS
SYSTOLIC BLOOD PRESSURE: 122 MMHG | HEIGHT: 72 IN | OXYGEN SATURATION: 97 % | TEMPERATURE: 98.1 F | HEART RATE: 61 BPM | DIASTOLIC BLOOD PRESSURE: 68 MMHG | RESPIRATION RATE: 18 BRPM | BODY MASS INDEX: 33.67 KG/M2 | WEIGHT: 248.6 LBS

## 2018-02-13 DIAGNOSIS — G20 PARKINSON DISEASE (HCC): Primary | ICD-10-CM

## 2018-02-13 NOTE — MR AVS SNAPSHOT
[address] [place] [address] [address] [facility] [address] [facility] [address] [phone] Patient: Mk Bentley MRN: [id number] Visit Information Date & Time Provider Department Dept. Phone Encounter #  
 2/13/2018  3:20 PM Anne Marie Villanueva MD [facility] [phone] [id number] Follow-up Instructions Return in about 6 months (around 8/13/2018). Upcoming Health Maintenance Date Due Hepatitis C Screening 1950 FOOT EXAM Q1 7/19/1958 EYE EXAM RETINAL OR DILATED Q1 7/19/1958 DTaP/Tdap/Td series (1 - Tdap) 7/19/1969 FOBT Q 1 YEAR AGE 50-75 7/19/1998 ZOSTER VACCINE AGE 60> 5/19/2008 GLAUCOMA SCREENING Q2Y 7/19/2013 Pneumococcal 65+ Low/Medium Risk (1 of 2 - PCV13) 7/19/2013 MEDICARE YEARLY EXAM 7/19/2013 Influenza Age 5 to Adult 8/1/2017 HEMOGLOBIN A1C Q6M 2/11/2018 MICROALBUMIN Q1 8/11/2018 LIPID PANEL Q1 8/11/2018 Allergies as of 2/13/2018  Review Complete On: 2/13/2018 By: Anne Marie Villanueva MD  
 No Known Allergies Current Immunizations  Never Reviewed No immunizations on file. Not reviewed this visit You Were Diagnosed With   
  
 Codes Comments [facility]    -  Primary ICD-10-CM: G20 
ICD-9-CM: 332.0 Vitals BP Pulse Temp Resp Height(growth percentile) Weight(growth percentile) 122/68 61 98.1 °F (36.7 °C) (Oral) 18 6' (1.829 m) 248 lb 9.6 oz (112.8 kg) SpO2 BMI Smoking Status 97% 33.72 kg/m2 Former Smoker Vitals History BMI and BSA Data Body Mass Index Body Surface Area 33.72 kg/m 2 2.39 m 2 Preferred Pharmacy Pharmacy Name Phone [id number] [place], [address] [facility]  [phone] Your Updated Medication List  
  
   
This list is accurate as of: 2/13/18  4:18 PM.  Always use your most recent med list.  
  
  
  
  
 aspirin delayed-release 81 mg tablet Take 81 mg by mouth daily. atenolol 50 mg tablet Commonly known as:  TENORMIN Take 50 mg by mouth daily. BENADRYL 25 mg capsule Generic drug:  diphenhydrAMINE Take 25 mg by mouth daily (with lunch). carbidopa-levodopa  mg per tablet Commonly known as:  SINEMET  
TAKE 1 TABLET BY MOUTH 4 TIMES A DAY  
  
 fluticasone 50 mcg/actuation nasal spray Commonly known as:  Madelyn New 2 Sprays by Both Nostrils route daily. gabapentin 300 mg capsule Commonly known as:  NEURONTIN  
TAKE TWO CAPSULE BY MOUTH AT BEDTIME  Indications: NEUROPATHIC PAIN  
  
 glipiZIDE 5 mg tablet Commonly known as:  Rona Bridges Take 5 mg by mouth Daily (before breakfast). LIPITOR 40 mg tablet Generic drug:  atorvastatin Take 40 mg by mouth daily (with dinner). metFORMIN 500 mg tablet Commonly known as:  GLUCOPHAGE Take 500 mg by mouth three (3) times daily (with meals). MOBIC 15 mg tablet Generic drug:  meloxicam  
Take 15 mg by mouth daily. multivits,Stress Formula-Zinc tablet Take 1 Tab by mouth daily. OSTEO BI-FLEX PO Take 1 Tab by mouth daily. PREVALITE 4 gram packet Generic drug:  cholestyramine-aspartame Dissolve one packet in 2-6 ounces of water or juice daily PriLOSEC OTC 20 mg tablet Generic drug:  omeprazole Take 20 mg by mouth daily. SINEX REGULAR NA  
by Nasal route. VITAMIN D2 50,000 unit capsule Generic drug:  ergocalciferol Take 50,000 Units by mouth. Takes on Friday XARELTO 20 mg Tab tablet Generic drug:  rivaroxaban  
20 mg daily (with dinner). Take one tablet by mouth daily  Indications: DEEP VENOUS THROMBOSIS Follow-up Instructions Return in about 6 months (around 8/13/2018). Patient Instructions PRESCRIPTION REFILL POLICY [facility] Statement to Patients April 1, 2014 In an effort to ensure the large volume of patient prescription refills is processed in the most efficient and expeditious manner, we are asking our patients to assist us by calling your Pharmacy for all prescription refills, this will include also your  Mail Order Pharmacy. The pharmacy will contact our office electronically to continue the refill process. Please do not wait until the last minute to call your pharmacy. We need at least 48 hours (2days) to fill prescriptions. We also encourage you to call your pharmacy before going to  your prescription to make sure it is ready. With regard to controlled substance prescription refill requests (narcotic refills) that need to be picked up at our office, we ask your cooperation by providing us with at least 72 hours (3days) notice that you will need a refill. We will not refill narcotic prescription refill requests after 4:00pm on any weekday, Monday through Thursday, or after 2:00pm on Fridays, or on the weekends. We encourage everyone to explore another way of getting your prescription refill request processed using [employer], our patient web portal through our electronic medical record system. [employer] is an efficient and effective way to communicate your medication request directly to the office and  downloadable as an jude on your smart phone . [employer] also features a review functionality that allows you to view your medication list as well as leave messages for your physician. Are you ready to get connected? If so please review the attatched instructions or speak to any of our staff to get you set up right away! Thank you so much for your cooperation. Should you have any questions please contact our Practice Administrator. The Physicians and Staff,  [facility] Patient is doing well and will not manipulate the medicine but urged to stay as safely and reasonably busy as possible.   Would recommend once a year dermatologic checks just to be on the safe side with any emerging skin cancer possibilities. Revisit 6 months. Introducing [facility]! Rick Isidro introduces [employer] patient portal. Now you can access parts of your medical record, email your doctor's office, and request medication refills online. 1. In your internet browser, go to [email or web address] 2. Click on the First Time User? Click Here link in the Sign In box. You will see the New Member Sign Up page. 3. Enter your [employer] Access Code exactly as it appears below. You will not need to use this code after youve completed the sign-up process. If you do not sign up before the expiration date, you must request a new code. · [employer] Access Code: [email or web address] Expires: 5/14/2018  4:18 PM 
 
4. Enter the last four digits of your Social Security Number (xxxx) and Date of Birth (mm/dd/yyyy) as indicated and click Submit. You will be taken to the next sign-up page. 5. Create a [employer] ID. This will be your [employer] login ID and cannot be changed, so think of one that is secure and easy to remember. 6. Create a [employer] password. You can change your password at any time. 7. Enter your Password Reset Question and Answer. This can be used at a later time if you forget your password. 8. Enter your e-mail address. You will receive e-mail notification when new information is available in [address]. 9. Click Sign Up. You can now view and download portions of your medical record. 10. Click the Download Summary menu link to download a portable copy of your medical information. If you have questions, please visit the Frequently Asked Questions section of the [employer] website. Remember, [employer] is NOT to be used for urgent needs. For medical emergencies, dial 911. Now available from your iPhone and Android! Please provide this summary of care documentation to your next provider. Your primary care clinician is listed as Dawn Palomo. If you have any questions after today's visit, please call [phone].

## 2018-02-13 NOTE — PROGRESS NOTES
Neurology Consult      Subjective:      Kayli Harris is a 69 y.o. male who comes in today with idiopathic Parkinson's disease. Is on simple immediate release Sinemet 25/100 4 times daily. Takes vitamin D. Does take the Sinemet with a little bit of crackers or applesauce and will get a little nausea at times with the drug. Stays busy at home and in social circles. Has noticed a little bit of breakthrough tremor inconvenience and has tried the Velcro weight wraps but sometimes get in the way. No falls in eating and sleeping are fine. Says sometimes he second guesses his episodic memory but otherwise does well and nobody else picks up on it. Will keep track of this. Nothing to suggest any emerging type of psychosis or REM sleep behavior disorder or similar concerns. I did recommend that once a year he have a dermatological check just to make sure there is not any emerging skin cancer possibilities. Said when he was younger he did burn in the sun some thinking this advice are to be followed for what it is worth. Does not mention any new medical or surgical history and I reassured him I thought he was doing fantastic and we have plenty of room to go higher on his Sinemet dosing. I will see him back in 6 months. Continue to encourage him to find ways to stay physically and mentally preoccupied. That is the key. Current Outpatient Prescriptions   Medication Sig Dispense Refill    carbidopa-levodopa (SINEMET)  mg per tablet TAKE 1 TABLET BY MOUTH 4 TIMES A  Tab 1    gabapentin (NEURONTIN) 300 mg capsule TAKE TWO CAPSULE BY MOUTH AT BEDTIME  Indications: NEUROPATHIC PAIN 180 Cap 1    PHENYLEPHRINE HCL (SINEX REGULAR NA) by Nasal route.  glipiZIDE (GLUCOTROL) 5 mg tablet Take 5 mg by mouth Daily (before breakfast).  metFORMIN (GLUCOPHAGE) 500 mg tablet Take 500 mg by mouth three (3) times daily (with meals).       fluticasone (FLONASE) 50 mcg/actuation nasal spray 2 Sprays by Both Nostrils route daily.  XARELTO 20 mg tab tablet 20 mg daily (with dinner). Take one tablet by mouth daily  Indications: DEEP VENOUS THROMBOSIS  4    PREVALITE 4 gram packet Dissolve one packet in 2-6 ounces of water or juice daily  11    diphenhydrAMINE (BENADRYL) 25 mg capsule Take 25 mg by mouth daily (with lunch).  meloxicam (MOBIC) 15 mg tablet Take 15 mg by mouth daily.  GLUCOSAMINE HCL/CHONDR CARVER A NA (OSTEO BI-FLEX PO) Take 1 Tab by mouth daily.  ergocalciferol (VITAMIN D2) 50,000 unit capsule Take 50,000 Units by mouth. Takes on Friday      atorvastatin (LIPITOR) 40 mg tablet Take 40 mg by mouth daily (with dinner).  multivits,Stress Formula-Zinc tablet Take 1 Tab by mouth daily.  atenolol (TENORMIN) 50 mg tablet Take 50 mg by mouth daily.  aspirin delayed-release 81 mg tablet Take 81 mg by mouth daily.  omeprazole (PRILOSEC OTC) 20 mg tablet Take 20 mg by mouth daily.         No Known Allergies  Past Medical History:   Diagnosis Date    Arthritis     CAD (coronary artery disease)     Chronic pain     joints    Diabetes ([address]     GERD (gastroesophageal reflux disease)     Hypertension     Ill-defined condition     hyperlipidemia    Ill-defined condition 11/2014    Parkinson's    MI, old 10/2000    Nausea & vomiting     aftr cardiac cath after MI- none since that time    Other ill-defined conditions(799.89)     L4-5 herniation    Thromboembolus ([address] 2006    Right calf DVT      Past Surgical History:   Procedure Laterality Date    CARDIAC SURG PROCEDURE UNLIST  2001,2009    pace maker    HX HEENT      bilateral cataract with IOL    HX HERNIA REPAIR Left 1968    inguinal    HX KNEE ARTHROSCOPY Right 2016    HX MOHS PROCEDURES Left 2012    HX ORTHOPAEDIC  2010    right rotator cuff repair    HX PACEMAKER      see card on paper medical record   [address]  2000,2001    stents x2      Social History     Social History    Marital status:      Spouse name: N/A    Number of children: N/A    Years of education: N/A     Occupational History    Not on file. Social History Main Topics    Smoking status: Former Smoker     Packs/day: 0.50     Years: 30.00     Quit date: 1/7/1995    Smokeless tobacco: Never Used    Alcohol use 2.4 oz/week     2 Glasses of wine, 1 Cans of beer, 1 Shots of liquor per week    Drug use: No    Sexual activity: Not on file     Other Topics Concern    Not on file     Social History Narrative      Family History   Problem Relation Age of Onset    No Known Problems Mother     Heart Disease Father     No Known Problems Sister       Visit Vitals    /68    Pulse 61    Temp 98.1 °F (36.7 °C) (Oral)    Resp 18    Ht 6' (1.829 m)    Wt 112.8 kg (248 lb 9.6 oz)    SpO2 97%    BMI 33.72 kg/m2        Review of Systems:   A comprehensive review of systems was negative except for that written in the HPI. Neuro Exam:     Appearance: The patient is well developed, well nourished, provides a coherent history and is in no acute distress. Mental Status: Oriented to time, place and person. Mood and affect appropriate. Cranial Nerves:   Intact visual fields. Fundi are benign. MARGIE, EOM's full, no nystagmus, no ptosis. Facial sensation is normal. Corneal reflexes are intact. Facial movement is symmetric. Hearing is normal bilaterally. Palate is midline with normal sternocleidomastoid and trapezius muscles are normal. Tongue is midline. Motor:  5/5 strength in upper and lower proximal and distal muscles. Normal bulk and tone. No fasciculations. Reflexes:   Deep tendon reflexes 2+/4 and symmetrical.   Sensory:   Normal to touch, pinprick and vibration. Gait:  Normal gait. Tremor:    A subtle left hand rest tremor tremor noted. Cerebellar:  No cerebellar signs present. Neurovascular:  Normal heart sounds and regular rhythm, peripheral pulses intact, and no carotid bruits.   No rigidity or bradykinesia or transfer issues and no retropulsion on the pull test.            Assessment:   Parkinson's disease. This gentleman looks great and I like his stay busy ethic and would recommend no manipulation of the Sinemet continue vitamin D and I would suggest an annual visit to the dermatologist just to make sure there is not any emerging skin cancer possibilities. Hopefully there will never be any. Revisit 6 months. Plan:   Revisit 6 months.   Signed by :  Roman Cavazos MD

## 2018-02-13 NOTE — PATIENT INSTRUCTIONS
[facility]   Statement to Patients  April 1, 2014      In an effort to ensure the large volume of patient prescription refills is processed in the most efficient and expeditious manner, we are asking our patients to assist us by calling your Pharmacy for all prescription refills, this will include also your  Mail Order Pharmacy. The pharmacy will contact our office electronically to continue the refill process. Please do not wait until the last minute to call your pharmacy. We need at least 48 hours (2days) to fill prescriptions. We also encourage you to call your pharmacy before going to  your prescription to make sure it is ready. With regard to controlled substance prescription refill requests (narcotic refills) that need to be picked up at our office, we ask your cooperation by providing us with at least 72 hours (3days) notice that you will need a refill. We will not refill narcotic prescription refill requests after 4:00pm on any weekday, Monday through Thursday, or after 2:00pm on Fridays, or on the weekends. We encourage everyone to explore another way of getting your prescription refill request processed using [employer], our patient web portal through our electronic medical record system. [employer] is an efficient and effective way to communicate your medication request directly to the office and  downloadable as an jude on your smart phone . [employer] also features a review functionality that allows you to view your medication list as well as leave messages for your physician. Are you ready to get connected? If so please review the attatched instructions or speak to any of our staff to get you set up right away! Thank you so much for your cooperation. Should you have any questions please contact our Practice Administrator.     The Physicians and Staff,  [facility]   Patient is doing well and will not manipulate the medicine but urged to stay as safely and reasonably busy as possible. Would recommend once a year dermatologic checks just to be on the safe side with any emerging skin cancer possibilities. Revisit 6 months.

## 2018-04-04 RX ORDER — GABAPENTIN 300 MG/1
CAPSULE ORAL
Qty: 180 CAP | Refills: 1 | Status: SHIPPED | OUTPATIENT
Start: 2018-04-04 | End: 2018-10-04 | Stop reason: SDUPTHER

## 2018-08-14 ENCOUNTER — OFFICE VISIT (OUTPATIENT)
Dept: NEUROLOGY | Age: 68
End: 2018-08-14

## 2018-08-14 VITALS
BODY MASS INDEX: 33.25 KG/M2 | DIASTOLIC BLOOD PRESSURE: 76 MMHG | OXYGEN SATURATION: 97 % | HEIGHT: 72 IN | HEART RATE: 60 BPM | SYSTOLIC BLOOD PRESSURE: 136 MMHG | WEIGHT: 245.5 LBS | RESPIRATION RATE: 18 BRPM

## 2018-08-14 DIAGNOSIS — G20 PARKINSON DISEASE (HCC): Primary | ICD-10-CM

## 2018-08-14 NOTE — MR AVS SNAPSHOT
[facility] [address] [facility] [address] [phone] Patient: Michelle Shell MRN: [id number] Visit Information Date & Time Provider Department Dept. Phone Encounter #  
 8/14/2018  2:40 PM [facility] [phone] [id number] Follow-up Instructions Return in about 6 months (around 2/14/2019). Upcoming Health Maintenance Date Due Hepatitis C Screening 1950 FOOT EXAM Q1 7/19/1958 EYE EXAM RETINAL OR DILATED Q1 7/19/1958 DTaP/Tdap/Td series (1 - Tdap) 7/19/1969 FOBT Q 1 YEAR AGE 50-75 7/19/1998 ZOSTER VACCINE AGE 60> 5/19/2008 GLAUCOMA SCREENING Q2Y 7/19/2013 Pneumococcal 65+ Low/Medium Risk (1 of 2 - PCV13) 7/19/2013 HEMOGLOBIN A1C Q6M 2/11/2018 MEDICARE YEARLY EXAM 3/14/2018 Influenza Age 5 to Adult 8/1/2018 MICROALBUMIN Q1 8/11/2018 LIPID PANEL Q1 8/11/2018 Allergies as of 8/14/2018  Review Complete On: 8/14/2018 By: Rosalia Blank MD  
 No Known Allergies Current Immunizations  Never Reviewed No immunizations on file. Not reviewed this visit You Were Diagnosed With   
  
 Codes Comments Parkinson disease ([facility]    -  Primary ICD-10-CM: G20 
ICD-9-CM: 332.0 Vitals BP Pulse Resp Height(growth percentile) Weight(growth percentile) SpO2  
 136/76 60 18 6' (1.829 m) 245 lb 8 oz (111.4 kg) 97% BMI Smoking Status 33.3 kg/m2 Former Smoker Vitals History BMI and BSA Data Body Mass Index Body Surface Area  
 33.3 kg/m 2 2.38 m 2 Preferred Pharmacy Pharmacy Name Phone [id number] [address] , [address] [place] [address]  [phone] Your Updated Medication List  
  
   
This list is accurate as of 8/14/18  3:15 PM.  Always use your most recent med list.  
  
  
  
  
 aspirin delayed-release 81 mg tablet Take 81 mg by mouth daily. atenolol 50 mg tablet Commonly known as:  TENORMIN Take 50 mg by mouth daily. BENADRYL 25 mg capsule Generic drug:  diphenhydrAMINE Take 25 mg by mouth daily (with lunch). carbidopa-levodopa  mg per tablet Commonly known as:  SINEMET  
TAKE 1 TABLET BY MOUTH 4 TIMES A DAY  
  
 fluticasone 50 mcg/actuation nasal spray Commonly known as:  Scott Merles 2 Sprays by Both Nostrils route daily. gabapentin 300 mg capsule Commonly known as:  NEURONTIN  
TAKE 2 CAPSULES BY MOUTH AT BEDTIME  
  
 glipiZIDE 5 mg tablet Commonly known as:  Escobar Nina Take 5 mg by mouth Daily (before breakfast). LIPITOR 40 mg tablet Generic drug:  atorvastatin Take 40 mg by mouth daily (with dinner). metFORMIN 500 mg tablet Commonly known as:  GLUCOPHAGE Take 500 mg by mouth three (3) times daily (with meals). MOBIC 15 mg tablet Generic drug:  meloxicam  
Take 15 mg by mouth daily. multivits,Stress Formula-Zinc tablet Take 1 Tab by mouth daily. OSTEO BI-FLEX PO Take 1 Tab by mouth daily. PREVALITE 4 gram packet Generic drug:  cholestyramine-aspartame Dissolve one packet in 2-6 ounces of water or juice daily PriLOSEC OTC 20 mg tablet Generic drug:  omeprazole Take 20 mg by mouth daily. SINEX REGULAR NA  
by Nasal route. VITAMIN D2 50,000 unit capsule Generic drug:  ergocalciferol Take 50,000 Units by mouth. Takes on Friday XARELTO 20 mg Tab tablet Generic drug:  rivaroxaban  
20 mg daily (with dinner). Take one tablet by mouth daily  Indications: DEEP VENOUS THROMBOSIS Follow-up Instructions Return in about 6 months (around 2/14/2019). Patient Instructions PRESCRIPTION REFILL POLICY [facility] Statement to Patients April 1, 2014 In an effort to ensure the large volume of patient prescription refills is processed in the most efficient and expeditious manner, we are asking our patients to assist us by calling your Pharmacy for all prescription refills, this will include also your  Mail Order Pharmacy. The pharmacy will contact our office electronically to continue the refill process. Please do not wait until the last minute to call your pharmacy. We need at least 48 hours (2days) to fill prescriptions. We also encourage you to call your pharmacy before going to  your prescription to make sure it is ready. With regard to controlled substance prescription refill requests (narcotic refills) that need to be picked up at our office, we ask your cooperation by providing us with at least 72 hours (3days) notice that you will need a refill. We will not refill narcotic prescription refill requests after 4:00pm on any weekday, Monday through Thursday, or after 2:00pm on Fridays, or on the weekends. We encourage everyone to explore another way of getting your prescription refill request processed using [employer], our patient web portal through our electronic medical record system. [employer] is an efficient and effective way to communicate your medication request directly to the office and  downloadable as an jude on your smart phone . [employer] also features a review functionality that allows you to view your medication list as well as leave messages for your physician. Are you ready to get connected? If so please review the attatched instructions or speak to any of our staff to get you set up right away! Thank you so much for your cooperation. Should you have any questions please contact our Practice Administrator. The Physicians and Staff,  [facility] A Healthy Lifestyle: Care Instructions Your Care Instructions A healthy lifestyle can help you feel good, stay at a healthy weight, and have plenty of energy for both work and play.  A healthy lifestyle is something you can share with your whole family. A healthy lifestyle also can lower your risk for serious health problems, such as high blood pressure, heart disease, and diabetes. You can follow a few steps listed below to improve your health and the health of your family. Follow-up care is a key part of your treatment and safety. Be sure to make and go to all appointments, and call your doctor if you are having problems. It's also a good idea to know your test results and keep a list of the medicines you take. How can you care for yourself at home? · Do not eat too much sugar, fat, or fast foods. You can still have dessert and treats now and then. The goal is moderation. · Start small to improve your eating habits. Pay attention to portion sizes, drink less juice and soda pop, and eat more fruits and vegetables. ¨ Eat a healthy amount of food. A 3-ounce serving of meat, for example, is about the size of a deck of cards. Fill the rest of your plate with vegetables and whole grains. ¨ Limit the amount of soda and sports drinks you have every day. Drink more water when you are thirsty. ¨ Eat at least 5 servings of fruits and vegetables every day. It may seem like a lot, but it is not hard to reach this goal. A serving or helping is 1 piece of fruit, 1 cup of vegetables, or 2 cups of leafy, raw vegetables. Have an apple or some carrot sticks as an afternoon snack instead of a candy bar. Try to have fruits and/or vegetables at every meal. 
· Make exercise part of your daily routine. You may want to start with simple activities, such as walking, bicycling, or slow swimming. Try to be active 30 to 60 minutes every day. You do not need to do all 30 to 60 minutes all at once. For example, you can exercise 3 times a day for 10 or 20 minutes.  Moderate exercise is safe for most people, but it is always a good idea to talk to your doctor before starting an exercise program. 
 · Keep moving. Nelsy Hu the lawn, work in the garden, or [place]. Take the stairs instead of the elevator at work. · If you smoke, quit. People who smoke have an increased risk for heart attack, stroke, cancer, and other lung illnesses. Quitting is hard, but there are ways to boost your chance of quitting tobacco for good. ¨ Use nicotine gum, patches, or lozenges. ¨ Ask your doctor about stop-smoking programs and medicines. ¨ Keep trying. In addition to reducing your risk of diseases in the future, you will notice some benefits soon after you stop using tobacco. If you have shortness of breath or asthma symptoms, they will likely get better within a few weeks after you quit. · Limit how much alcohol you drink. Moderate amounts of alcohol (up to 2 drinks a day for men, 1 drink a day for women) are okay. But drinking too much can lead to liver problems, high blood pressure, and other health problems. Family health If you have a family, there are many things you can do together to improve your health. · Eat meals together as a family as often as possible. · Eat healthy foods. This includes fruits, vegetables, lean meats and dairy, and whole grains. · Include your family in your fitness plan. Most people think of activities such as jogging or tennis as the way to fitness, but there are many ways you and your family can be more active. Anything that makes you breathe hard and gets your heart pumping is exercise. Here are some tips: 
¨ Walk to do errands or to take your child to school or the bus. ¨ Go for a family bike ride after dinner instead of watching TV. Where can you learn more? Go to http://cameron-jaye.info/. Enter I186 in the search box to learn more about \"A Healthy Lifestyle: Care Instructions. \" Current as of: December 7, 2017 Content Version: 11.7 © 0694-9232 [employer].  Care instructions adapted under license by Bhavin Lopez (which disclaims liability or warranty for this information). If you have questions about a medical condition or this instruction, always ask your healthcare professional. [address] any warranty or liability for your use of this information. Patient history reviewed and patient examined. He will continue the Sinemet as it has and stay is physically and cognitively busy as he can. A simple suggestion if interested would be to require a good exercise stationary bike during those troublesome cold dark days of the winter. Suggest revisit in 6 months. My literature suggests that socialization is always a good direction. Introducing [facility]! [employer] introduces [employer] patient portal. Now you can access parts of your medical record, email your doctor's office, and request medication refills online. 1. In your internet browser, go to [email or web address] 2. Click on the First Time User? Click Here link in the Sign In box. You will see the New Member Sign Up page. 3. Enter your [employer] Access Code exactly as it appears below. You will not need to use this code after youve completed the sign-up process. If you do not sign up before the expiration date, you must request a new code. · [employer] Access Code: [email or web address] Expires: 11/12/2018  3:15 PM 
 
4. Enter the last four digits of your Social Security Number (xxxx) and Date of Birth (mm/dd/yyyy) as indicated and click Submit. You will be taken to the next sign-up page. 5. Create a Step Labst ID. This will be your [employer] login ID and cannot be changed, so think of one that is secure and easy to remember. 6. Create a [employer] password. You can change your password at any time. 7. Enter your Password Reset Question and Answer. This can be used at a later time if you forget your password. 8. Enter your e-mail address.  You will receive e-mail notification when new information is available in J[employer]Box. 9. Click Sign Up. You can now view and download portions of your medical record. 10. Click the Download Summary menu link to download a portable copy of your medical information. If you have questions, please visit the Frequently Asked Questions section of the [employer] website. Remember, [employer] is NOT to be used for urgent needs. For medical emergencies, dial 911. Now available from your iPhone and Android! Please provide this summary of care documentation to your next provider. Your primary care clinician is listed as Vladislav Heard. If you have any questions after today's visit, please call [phone].

## 2018-08-14 NOTE — PATIENT INSTRUCTIONS
[facility]   Statement to Patients  April 1, 2014      In an effort to ensure the large volume of patient prescription refills is processed in the most efficient and expeditious manner, we are asking our patients to assist us by calling your Pharmacy for all prescription refills, this will include also your  Mail Order Pharmacy. The pharmacy will contact our office electronically to continue the refill process. Please do not wait until the last minute to call your pharmacy. We need at least 48 hours (2days) to fill prescriptions. We also encourage you to call your pharmacy before going to  your prescription to make sure it is ready. With regard to controlled substance prescription refill requests (narcotic refills) that need to be picked up at our office, we ask your cooperation by providing us with at least 72 hours (3days) notice that you will need a refill. We will not refill narcotic prescription refill requests after 4:00pm on any weekday, Monday through Thursday, or after 2:00pm on Fridays, or on the weekends. We encourage everyone to explore another way of getting your prescription refill request processed using SendMe, our patient web portal through our electronic medical record system. SendMe is an efficient and effective way to communicate your medication request directly to the office and  downloadable as an jude on your smart phone . SendMe also features a review functionality that allows you to view your medication list as well as leave messages for your physician. Are you ready to get connected? If so please review the attatched instructions or speak to any of our staff to get you set up right away! Thank you so much for your cooperation. Should you have any questions please contact our Practice Administrator.     The Physicians and Staff,  [facility]                A Healthy Lifestyle: Care Instructions  Your Care Instructions    A healthy lifestyle can help you feel good, stay at a healthy weight, and have plenty of energy for both work and play. A healthy lifestyle is something you can share with your whole family. A healthy lifestyle also can lower your risk for serious health problems, such as high blood pressure, heart disease, and diabetes. You can follow a few steps listed below to improve your health and the health of your family. Follow-up care is a key part of your treatment and safety. Be sure to make and go to all appointments, and call your doctor if you are having problems. It's also a good idea to know your test results and keep a list of the medicines you take. How can you care for yourself at home? · Do not eat too much sugar, fat, or fast foods. You can still have dessert and treats now and then. The goal is moderation. · Start small to improve your eating habits. Pay attention to portion sizes, drink less juice and soda pop, and eat more fruits and vegetables. ¨ Eat a healthy amount of food. A 3-ounce serving of meat, for example, is about the size of a deck of cards. Fill the rest of your plate with vegetables and whole grains. ¨ Limit the amount of soda and sports drinks you have every day. Drink more water when you are thirsty. ¨ Eat at least 5 servings of fruits and vegetables every day. It may seem like a lot, but it is not hard to reach this goal. A serving or helping is 1 piece of fruit, 1 cup of vegetables, or 2 cups of leafy, raw vegetables. Have an apple or some carrot sticks as an afternoon snack instead of a candy bar. Try to have fruits and/or vegetables at every meal.  · Make exercise part of your daily routine. You may want to start with simple activities, such as walking, bicycling, or slow swimming. Try to be active 30 to 60 minutes every day. You do not need to do all 30 to 60 minutes all at once. For example, you can exercise 3 times a day for 10 or 20 minutes.  Moderate exercise is safe for most people, but it is always a good idea to talk to your doctor before starting an exercise program.  · Keep moving. Earl Caraballo the lawn, work in the garden, or [employer]. Take the stairs instead of the elevator at work. · If you smoke, quit. People who smoke have an increased risk for heart attack, stroke, cancer, and other lung illnesses. Quitting is hard, but there are ways to boost your chance of quitting tobacco for good. ¨ Use nicotine gum, patches, or lozenges. ¨ Ask your doctor about stop-smoking programs and medicines. ¨ Keep trying. In addition to reducing your risk of diseases in the future, you will notice some benefits soon after you stop using tobacco. If you have shortness of breath or asthma symptoms, they will likely get better within a few weeks after you quit. · Limit how much alcohol you drink. Moderate amounts of alcohol (up to 2 drinks a day for men, 1 drink a day for women) are okay. But drinking too much can lead to liver problems, high blood pressure, and other health problems. Family health  If you have a family, there are many things you can do together to improve your health. · Eat meals together as a family as often as possible. · Eat healthy foods. This includes fruits, vegetables, lean meats and dairy, and whole grains. · Include your family in your fitness plan. Most people think of activities such as jogging or tennis as the way to fitness, but there are many ways you and your family can be more active. Anything that makes you breathe hard and gets your heart pumping is exercise. Here are some tips:  ¨ Walk to do errands or to take your child to school or the bus. ¨ Go for a family bike ride after dinner instead of watching TV. Where can you learn more? Go to http://cameron-jaye.info/. Enter P697 in the search box to learn more about \"A Healthy Lifestyle: Care Instructions. \"  Current as of: December 7, 2017  Content Version: 11.7  © 3037-3063 [employer]. Care instructions adapted under license by [employer] (which disclaims liability or warranty for this information). If you have questions about a medical condition or this instruction, always ask your healthcare professional. [address] any warranty or liability for your use of this information. Patient history reviewed and patient examined. He will continue the Sinemet as it has and stay is physically and cognitively busy as he can. A simple suggestion if interested would be to require a good exercise stationary bike during those troublesome cold dark days of the winter. Suggest revisit in 6 months. My literature suggests that socialization is always a good direction.

## 2018-08-14 NOTE — PROGRESS NOTES
Neurology Consult      Subjective:      Mark Dela Cruz is a 77 y.o. male who comes in today with Parkinson's disease follow-up. Is on Sinemet immediate release 25/100-4 times a day and tolerates it well. He does notice if he eats heavy protein that the Sinemet is less predictable. Sleeping is good no evidence of REM sleep behavior disorder or psychosis. Mood and behavior is great and he has an excellent motivation to stay busy. We talked about the cold winter months and he does have concerns about what he does on the colder days with his heart. I suggested getting a stationary exercise bike and making the most of his time on those dates. Bowel and bladder function as status quo as a special sensory function. Says cognition is good and we took time to talk about some of the research going on in the [address] [place] [address] and elsewhere in terms of immediate and long-term treatment plans but nothing that I can share with him. I thought today's exam was right on spot with his last 2 or 3 visits. He looks good has no complaints and will not manipulate the Sinemet. He has had no history of falls or near accidents. Current Outpatient Prescriptions   Medication Sig Dispense Refill    gabapentin (NEURONTIN) 300 mg capsule TAKE 2 CAPSULES BY MOUTH AT BEDTIME 180 Cap 1    carbidopa-levodopa (SINEMET)  mg per tablet TAKE 1 TABLET BY MOUTH 4 TIMES A  Tab 1    glipiZIDE (GLUCOTROL) 5 mg tablet Take 5 mg by mouth Daily (before breakfast).  metFORMIN (GLUCOPHAGE) 500 mg tablet Take 500 mg by mouth three (3) times daily (with meals).  fluticasone (FLONASE) 50 mcg/actuation nasal spray 2 Sprays by Both Nostrils route daily.  XARELTO 20 mg tab tablet 20 mg daily (with dinner).  Take one tablet by mouth daily  Indications: DEEP VENOUS THROMBOSIS  4    PREVALITE 4 gram packet Dissolve one packet in 2-6 ounces of water or juice daily  11    diphenhydrAMINE (BENADRYL) 25 mg capsule Take 25 mg by mouth daily (with lunch).  meloxicam (MOBIC) 15 mg tablet Take 15 mg by mouth daily.  GLUCOSAMINE HCL/CHONDR CARVER A NA (OSTEO BI-FLEX PO) Take 1 Tab by mouth daily.  ergocalciferol (VITAMIN D2) 50,000 unit capsule Take 50,000 Units by mouth. Takes on Friday      atorvastatin (LIPITOR) 40 mg tablet Take 40 mg by mouth daily (with dinner).  multivits,Stress Formula-Zinc tablet Take 1 Tab by mouth daily.  atenolol (TENORMIN) 50 mg tablet Take 50 mg by mouth daily.  aspirin delayed-release 81 mg tablet Take 81 mg by mouth daily.  omeprazole (PRILOSEC OTC) 20 mg tablet Take 20 mg by mouth daily.  PHENYLEPHRINE HCL (SINEX REGULAR NA) by Nasal route. No Known Allergies  Past Medical History:   Diagnosis Date    Arthritis     CAD (coronary artery disease)     Chronic pain     joints    Diabetes ([address]     GERD (gastroesophageal reflux disease)     Hypertension     Ill-defined condition     hyperlipidemia    Ill-defined condition 11/2014    Parkinson's    MI, old 10/2000    Nausea & vomiting     aftr cardiac cath after MI- none since that time    Other ill-defined conditions(799.89)     L4-5 herniation    Thromboembolus ([address] 2006    Right calf DVT      Past Surgical History:   Procedure Laterality Date    CARDIAC SURG PROCEDURE UNLIST  2001,2009    pace maker    HX HEENT      bilateral cataract with IOL    HX HERNIA REPAIR Left 1968    inguinal    HX KNEE ARTHROSCOPY Right 2016    HX MOHS PROCEDURES Left 2012    HX ORTHOPAEDIC  2010    right rotator cuff repair    HX PACEMAKER      see card on paper medical record   [address]  2000,2001    stents x2      Social History     Social History    Marital status:      Spouse name: N/A    Number of children: N/A    Years of education: N/A     Occupational History    Not on file.      Social History Main Topics    Smoking status: Former Smoker     Packs/day: 0.50     Years: 30.00     Quit date: 1/7/1995    Smokeless tobacco: Never Used    Alcohol use 2.4 oz/week     2 Glasses of wine, 1 Cans of beer, 1 Shots of liquor per week    Drug use: No    Sexual activity: Not on file     Other Topics Concern    Not on file     Social History Narrative      Family History   Problem Relation Age of Onset    No Known Problems Mother     Heart Disease Father     No Known Problems Sister       Visit Vitals    /76    Pulse 60    Resp 18    Ht 6' (1.829 m)    Wt 111.4 kg (245 lb 8 oz)    SpO2 97%    BMI 33.3 kg/m2        Review of Systems:   A comprehensive review of systems was negative except for that written in the HPI. Neuro Exam:     Appearance: The patient is well developed, well nourished, provides a coherent history and is in no acute distress. Mental Status: Oriented to time, place and person. Mood and affect appropriate. Cranial Nerves:   Intact visual fields. Fundi are benign. MARGIE, EOM's full, no nystagmus, no ptosis. Facial sensation is normal. Corneal reflexes are intact. Facial movement is symmetric. Hearing is normal bilaterally. Palate is midline with normal sternocleidomastoid and trapezius muscles are normal. Tongue is midline. Motor:  5/5 strength in upper and lower proximal and distal muscles. Normal bulk and tone. No fasciculations. Reflexes:   Deep tendon reflexes 2+/4 and symmetrical.   Sensory:   Normal to touch, pinprick and vibration. Gait:  Normal gait. Tremor:   No tremor noted except on finger-nose-finger movements he had a slight intention tremor component. .   Cerebellar:  No cerebellar signs present. Neurovascular:  Normal heart sounds and regular rhythm, peripheral pulses intact, and no carotid bruits. No rigidity or bradykinesia. Transfers well taken. Assessment:   Parkinson's disease. I honestly think he is at his proverbial baseline today compared to the last visit or 2.   Does have occasional problems with resting tremor as it applies to movements of fine finesse such as writing. Is welcome to try simple Velcro fasteners weights to see if the dampens the oscillations. Continue Sinemet as is and keep his reasonably and safely busy as possible. Socialization is always good for the mind and body. Revisit 6 months. Plan:   Revisit 6 months.   Signed by :  Tj Ariza MD

## 2018-09-03 RX ORDER — CARBIDOPA AND LEVODOPA 25; 100 MG/1; MG/1
TABLET ORAL
Qty: 360 TAB | Refills: 1 | Status: SHIPPED | OUTPATIENT
Start: 2018-09-03 | End: 2019-09-02 | Stop reason: SDUPTHER

## 2018-10-04 RX ORDER — GABAPENTIN 300 MG/1
CAPSULE ORAL
Qty: 180 CAP | Refills: 1 | Status: SHIPPED | OUTPATIENT
Start: 2018-10-04 | End: 2019-04-04 | Stop reason: SDUPTHER

## 2019-02-15 ENCOUNTER — OFFICE VISIT (OUTPATIENT)
Dept: NEUROLOGY | Age: 69
End: 2019-02-15

## 2019-02-15 VITALS
HEART RATE: 57 BPM | DIASTOLIC BLOOD PRESSURE: 74 MMHG | RESPIRATION RATE: 18 BRPM | HEIGHT: 72 IN | WEIGHT: 246.7 LBS | OXYGEN SATURATION: 97 % | SYSTOLIC BLOOD PRESSURE: 118 MMHG | BODY MASS INDEX: 33.41 KG/M2

## 2019-02-15 DIAGNOSIS — G20 PARKINSON DISEASE (HCC): Primary | ICD-10-CM

## 2019-02-15 NOTE — PROGRESS NOTES
Neurology Consult      Subjective:      Fernanda Wolf is a 77 y.o. male who comes in today with established idiopathic Parkinson's. Is doing amazingly well on Sinemet immediate release 25/100 4 times daily. He was asking about other medications and a once a day version and I am not aware of any. I did tell him there is a Neupro patch to give sustained drug delivery and it is good in its own right. Potential side effects would have to be worked in including sedation and pulse control disorder peripheral edema hallucinations orthostatic hypotension to name a few. But it is a good drug. 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 Bowel and bladder is good mood and behavior is good very smart on his feet and tries not to push his luck but takes his time and uses a great deal of commonsense to avoid falling. Has had no falling or near syncope or syncope sleeping is good eating is fine and I thought he was very much at his baseline. I think he is on board with everything and the suggestions and I do not think I need to bug him for at least another 6 months. Knows where to find me if that needs to change. Current Outpatient Medications   Medication Sig Dispense Refill    gabapentin (NEURONTIN) 300 mg capsule TAKE 2 CAPSULES BY MOUTH AT BEDTIME 180 Cap 1    carbidopa-levodopa (SINEMET)  mg per tablet TAKE 1 TABLET BY MOUTH 4 TIMES A  Tab 1    glipiZIDE (GLUCOTROL) 5 mg tablet Take 5 mg by mouth Daily (before breakfast).  metFORMIN (GLUCOPHAGE) 500 mg tablet Take 500 mg by mouth three (3) times daily (with meals).  fluticasone (FLONASE) 50 mcg/actuation nasal spray 2 Sprays by Both Nostrils route daily.  XARELTO 20 mg tab tablet 20 mg daily (with dinner).  Take one tablet by mouth daily  Indications: DEEP VENOUS THROMBOSIS  4    PREVALITE 4 gram packet Dissolve one packet in 2-6 ounces of water or juice daily  11    diphenhydrAMINE (BENADRYL) 25 mg capsule Take 25 mg by mouth daily (with lunch).  meloxicam (MOBIC) 15 mg tablet Take 15 mg by mouth daily.  GLUCOSAMINE HCL/CHONDR CARVER A NA (OSTEO BI-FLEX PO) Take 1 Tab by mouth daily.  ergocalciferol (VITAMIN D2) 50,000 unit capsule Take 50,000 Units by mouth. Takes on Friday      atorvastatin (LIPITOR) 40 mg tablet Take 40 mg by mouth daily (with dinner).  multivits,Stress Formula-Zinc tablet Take 1 Tab by mouth daily.  atenolol (TENORMIN) 50 mg tablet Take 50 mg by mouth daily.  aspirin delayed-release 81 mg tablet Take 81 mg by mouth daily.  omeprazole (PRILOSEC OTC) 20 mg tablet Take 20 mg by mouth daily.  PHENYLEPHRINE HCL (SINEX REGULAR NA) by Nasal route.         No Known Allergies  Past Medical History:   Diagnosis Date    Arthritis     CAD (coronary artery disease)     Chronic pain     joints    Diabetes ([address]     GERD (gastroesophageal reflux disease)     Hypertension     Ill-defined condition     hyperlipidemia    Ill-defined condition 11/2014    Parkinson's    MI, old 10/2000    Nausea & vomiting     aftr cardiac cath after MI- none since that time    Other ill-defined conditions(799.89)     L4-5 herniation    Thromboembolus ([address] 2006    Right calf DVT      Past Surgical History:   Procedure Laterality Date    CARDIAC SURG PROCEDURE UNLIST  2001,2009    pace maker    HX HEENT      bilateral cataract with IOL    HX HERNIA REPAIR Left 1968    inguinal    HX KNEE ARTHROSCOPY Right 2016    HX MOHS PROCEDURES Left 2012    HX ORTHOPAEDIC  2010    right rotator cuff repair    HX PACEMAKER      see card on paper medical record   [address]  2000,[address]    stents x2      Social History     Socioeconomic History    Marital status:      Spouse name: Not on file    Number of children: Not on file    Years of education: Not on file    Highest education level: Not on file   Social Needs    Financial resource strain: Not on file    Food insecurity - worry: Not on file    Food insecurity - inability: Not on file    Transportation needs - medical: Not on file   [employer] needs - non-medical: Not on file   Occupational History    Not on file   Tobacco Use    Smoking status: Former Smoker     Packs/day: 0.50     Years: 30.00     Pack years: 15.00     Last attempt to quit: 1995     Years since quittin.1    Smokeless tobacco: Never Used   Substance and Sexual Activity    Alcohol use: Yes     Alcohol/week: 2.4 oz     Types: 2 Glasses of wine, 1 Cans of beer, 1 Shots of liquor per week    Drug use: No    Sexual activity: Not on file   Other Topics Concern    Not on file   Social History Narrative    Not on file      Family History   Problem Relation Age of Onset    No Known Problems Mother     Heart Disease Father     No Known Problems Sister       Visit Vitals  /74   Pulse (!) 57   Resp 18   Ht 6' (1.829 m)   Wt 111.9 kg (246 lb 11.2 oz)   SpO2 97%   BMI 33.46 kg/m²        Review of Systems:   A comprehensive review of systems was negative except for that written in the HPI. Neuro Exam:     Appearance: The patient is well developed, well nourished, provides a coherent history and is in no acute distress. Mental Status: Oriented to time, place and person. Mood and affect appropriate. Cranial Nerves:   Intact visual fields. Fundi are benign. MARGIE, EOM's full, no nystagmus, no ptosis. Facial sensation is normal. Corneal reflexes are intact. Facial movement is symmetric. Hearing is normal bilaterally. Palate is midline with normal sternocleidomastoid and trapezius muscles are normal. Tongue is midline. Motor:  5/5 strength in upper and lower proximal and distal muscles. Normal bulk and tone. No fasciculations. Reflexes:   Deep tendon reflexes 2+/4 and symmetrical.   Sensory:   Normal to touch, pinprick and vibration. Gait:  Normal gait. On the pull test resists retropulsion. Has mild degree of rigidity right upper extremity. Transfers were taken very efficiently. Tremor:   No tremor noted. Cerebellar:  No cerebellar signs present. Neurovascular:  Normal heart sounds and regular rhythm, peripheral pulses intact, and no carotid bruits. Assessment:   Parkinson's disease. We will continue the Sinemet dose as his we have plenty of room to go higher including the possibility of supplemental drug addition. As I explained to him have to be careful what drugs are added in terms of the give and take with potential side effects. He looks extremely baseline to me today and understands the importance of staying physically and mentally engaged. Plan:   Revisit 6 months.   Signed by :  Nidhi Quiñonez MD

## 2019-02-15 NOTE — PATIENT INSTRUCTIONS
10 [facility]   Statement to Patients  April 1, 2014      In an effort to ensure the large volume of patient prescription refills is processed in the most efficient and expeditious manner, we are asking our patients to assist us by calling your Pharmacy for all prescription refills, this will include also your  Mail Order Pharmacy. The pharmacy will contact our office electronically to continue the refill process. Please do not wait until the last minute to call your pharmacy. We need at least 48 hours (2days) to fill prescriptions. We also encourage you to call your pharmacy before going to  your prescription to make sure it is ready. With regard to controlled substance prescription refill requests (narcotic refills) that need to be picked up at our office, we ask your cooperation by providing us with at least 72 hours (3days) notice that you will need a refill. We will not refill narcotic prescription refill requests after 4:00pm on any weekday, Monday through Thursday, or after 2:00pm on Fridays, or on the weekends. We encourage everyone to explore another way of getting your prescription refill request processed using [employer], our patient web portal through our electronic medical record system. [employer] is an efficient and effective way to communicate your medication request directly to the office and  downloadable as an jude on your smart phone . [employer] also features a review functionality that allows you to view your medication list as well as leave messages for your physician. Are you ready to get connected? If so please review the attatched instructions or speak to any of our staff to get you set up right away! Thank you so much for your cooperation. Should you have any questions please contact our Practice Administrator.     The Physicians and Staff,  [facility] [address]  What is a living will?    A living will is a legal form you use to write down the kind of care you want at the end of your life. It is used by the health professionals who will treat you if you aren't able to decide for yourself. If you put your wishes in writing, your loved ones and others will know what kind of care you want. They won't need to guess. This can ease your mind and be helpful to others. A living will is not the same as an estate or property will. An estate will explains what you want to happen with your money and property after you die. Is a living will a legal document? A living will is a legal document. Each state has its own laws about living medrano. If you move to another state, make sure that your living will is legal in the state where you now live. Or you might use a universal form that has been approved by many states. This kind of form can sometimes be completed and stored online. Your electronic copy will then be available wherever you have a connection to the Internet. In most cases, doctors will respect your wishes even if you have a form from a different state. · You don't need an  to complete a living will. But legal advice can be helpful if your state's laws are unclear, your health history is complicated, or your family can't agree on what should be in your living will. · You can change your living will at any time. Some people find that their wishes about end-of-life care change as their health changes. · In addition to making a living will, think about completing a medical power of  form. This form lets you name the person you want to make end-of-life treatment decisions for you (your \"health care agent\") if you're not able to. Many hospitals and nursing homes will give you the forms you need to complete a living will and a medical power of . · Your living will is used only if you can't make or communicate decisions for yourself anymore.  If you become able to make decisions again, you can accept or refuse any treatment, no matter what you wrote in your living will. · Your state may offer an online registry. This is a place where you can store your living will online so the doctors and nurses who need to treat you can find it right away. What should you think about when creating a living will? Talk about your end-of-life wishes with your family members and your doctor. Let them know what you want. That way the people making decisions for you won't be surprised by your choices. Think about these questions as you make your living will:  · Do you know enough about life support methods that might be used? If not, talk to your doctor so you know what might be done if you can't breathe on your own, your heart stops, or you're unable to swallow. · What things would you still want to be able to do after you receive life-support methods? Would you want to be able to walk? To speak? To eat on your own? To live without the help of machines? · If you have a choice, where do you want to be cared for? In your home? At a hospital or nursing home? · Do you want certain Presybeterian practices performed if you become very ill? · If you have a choice at the end of your life, where would you prefer to die? At home? In a hospital or nursing home? Somewhere else? · Would you prefer to be buried or cremated? · Do you want your organs to be donated after you die? What should you do with your living will? · Make sure that your family members and your health care agent have copies of your living will. · Give your doctor a copy of your living will to keep in your medical record. If you have more than one doctor, make sure that each one has a copy. · You may want to put a copy of your living will where it can be easily found. Where can you learn more? Go to http://cameron-jaye.info/. Enter H189 in the search box to learn more about \"Learning About Living Benoit Mendez. \"  Current as of: April 18, 2018  Content Version: 11.9  © 9353-6905 [employer], [employer]. Care instructions adapted under license by [employer] (which disclaims liability or warranty for this information). If you have questions about a medical condition or this instruction, always ask your healthcare professional. [address] any warranty or liability for your use of this information. Patient history reviewed and patient examined. Seems to be extremely steady state and understands the importance of staying mentally and physically engaged. Have plenty of room on the Sinemet to go higher and potentially support drugs as well. As always, medication adjustments and new medicine is always a toss up, between what you hope to accomplish and what you hope to avoid. Revisit 6 months.

## 2019-04-04 RX ORDER — GABAPENTIN 300 MG/1
CAPSULE ORAL
Qty: 180 CAP | Refills: 1 | Status: SHIPPED | OUTPATIENT
Start: 2019-04-04 | End: 2019-07-03 | Stop reason: SDUPTHER

## 2019-07-03 DIAGNOSIS — M79.2 NEUROPATHIC PAIN: Primary | ICD-10-CM

## 2019-07-03 RX ORDER — GABAPENTIN 300 MG/1
CAPSULE ORAL
Qty: 180 CAP | Refills: 1 | Status: SHIPPED | OUTPATIENT
Start: 2019-07-03 | End: 2019-12-24

## 2019-08-21 ENCOUNTER — OFFICE VISIT (OUTPATIENT)
Dept: NEUROLOGY | Age: 69
End: 2019-08-21

## 2019-08-21 VITALS
RESPIRATION RATE: 18 BRPM | HEART RATE: 62 BPM | OXYGEN SATURATION: 97 % | WEIGHT: 243.4 LBS | SYSTOLIC BLOOD PRESSURE: 126 MMHG | DIASTOLIC BLOOD PRESSURE: 74 MMHG | HEIGHT: 72 IN | BODY MASS INDEX: 32.97 KG/M2

## 2019-08-21 DIAGNOSIS — G20 PARKINSON DISEASE (HCC): Primary | ICD-10-CM

## 2019-08-21 NOTE — LETTER
8/21/19 Patient: Brannon Esteban YOB: 1950 Date of Visit: 8/21/2019 Garret Christian MD 
[address] [facility] [id number] VIA Facsimile: [phone] Dear Garret Christian MD, Thank you for referring Mr. Clara Flores to [facility] for evaluation. My notes for this consultation are attached. If you have questions, please do not hesitate to call me. I look forward to following your patient along with you.  
 
 
Sincerely, 
 
Evelia Grissom MD

## 2019-08-21 NOTE — PATIENT INSTRUCTIONS
10 [facility]   Statement to Patients  April 1, 2014      In an effort to ensure the large volume of patient prescription refills is processed in the most efficient and expeditious manner, we are asking our patients to assist us by calling your Pharmacy for all prescription refills, this will include also your  Mail Order Pharmacy. The pharmacy will contact our office electronically to continue the refill process. Please do not wait until the last minute to call your pharmacy. We need at least 48 hours (2days) to fill prescriptions. We also encourage you to call your pharmacy before going to  your prescription to make sure it is ready. With regard to controlled substance prescription refill requests (narcotic refills) that need to be picked up at our office, we ask your cooperation by providing us with at least 72 hours (3days) notice that you will need a refill. We will not refill narcotic prescription refill requests after 4:00pm on any weekday, Monday through Thursday, or after 2:00pm on Fridays, or on the weekends. We encourage everyone to explore another way of getting your prescription refill request processed using [employer], our patient web portal through our electronic medical record system. [employer] is an efficient and effective way to communicate your medication request directly to the office and  downloadable as an jude on your smart phone . [employer] also features a review functionality that allows you to view your medication list as well as leave messages for your physician. Are you ready to get connected? If so please review the attatched instructions or speak to any of our staff to get you set up right away! Thank you so much for your cooperation. Should you have any questions please contact our Practice Administrator.     The Physicians and Staff,  [facility] [address]  What is a living will? A living will is a legal form you use to write down the kind of care you want at the end of your life. It is used by the health professionals who will treat you if you aren't able to decide for yourself. If you put your wishes in writing, your loved ones and others will know what kind of care you want. They won't need to guess. This can ease your mind and be helpful to others. A living will is not the same as an estate or property will. An estate will explains what you want to happen with your money and property after you die. Is a living will a legal document? A living will is a legal document. Each state has its own laws about living medrano. If you move to another state, make sure that your living will is legal in the state where you now live. Or you might use a universal form that has been approved by many states. This kind of form can sometimes be completed and stored online. Your electronic copy will then be available wherever you have a connection to the Internet. In most cases, doctors will respect your wishes even if you have a form from a different state. · You don't need an  to complete a living will. But legal advice can be helpful if your state's laws are unclear, your health history is complicated, or your family can't agree on what should be in your living will. · You can change your living will at any time. Some people find that their wishes about end-of-life care change as their health changes. · In addition to making a living will, think about completing a medical power of  form. This form lets you name the person you want to make end-of-life treatment decisions for you (your \"health care agent\") if you're not able to. Many hospitals and nursing homes will give you the forms you need to complete a living will and a medical power of . · Your living will is used only if you can't make or communicate decisions for yourself anymore.  If you become able to make decisions again, you can accept or refuse any treatment, no matter what you wrote in your living will. · Your state may offer an online registry. This is a place where you can store your living will online so the doctors and nurses who need to treat you can find it right away. What should you think about when creating a living will? Talk about your end-of-life wishes with your family members and your doctor. Let them know what you want. That way the people making decisions for you won't be surprised by your choices. Think about these questions as you make your living will:  · Do you know enough about life support methods that might be used? If not, talk to your doctor so you know what might be done if you can't breathe on your own, your heart stops, or you're unable to swallow. · What things would you still want to be able to do after you receive life-support methods? Would you want to be able to walk? To speak? To eat on your own? To live without the help of machines? · If you have a choice, where do you want to be cared for? In your home? At a hospital or nursing home? · Do you want certain Confucianism practices performed if you become very ill? · If you have a choice at the end of your life, where would you prefer to die? At home? In a hospital or nursing home? Somewhere else? · Would you prefer to be buried or cremated? · Do you want your organs to be donated after you die? What should you do with your living will? · Make sure that your family members and your health care agent have copies of your living will. · Give your doctor a copy of your living will to keep in your medical record. If you have more than one doctor, make sure that each one has a copy. · You may want to put a copy of your living will where it can be easily found. Where can you learn more? Go to http://cameron-jaye.info/.   Enter W444 in the search box to learn more about \"Learning About Living Rios.\"  Current as of: April 1, 2019  Content Version: 12.1  © 8810-2846 [employer], [facility]. Care instructions adapted under license by [employer] (which disclaims liability or warranty for this information). If you have questions about a medical condition or this instruction, always ask your healthcare professional. [address] any warranty or liability for your use of this information. Went over patient history and exam.  Patient is doing extremely well and encouraged him to stay as safely busy and keep his mind engaged as well. Is on a low dose of Sinemet and went over some of the food and supplements that may affect the efficacy of the Sinemet. Revisit in 6 months.

## 2019-08-21 NOTE — PROGRESS NOTES
Parkinson's disease. Neurology Consult      Subjective:      Darryl Ross is a 68 y.o. male for his usual Parkinson's disease follow-up. Is seen every 6 months and that will continue to be the case based on today's encounter. Has had no falls and balance bladder function is status quo as is bulbar function. Does notice some slight increase amplitude to his tremors that he takes reasonable precautions and activities such as in the garage and around tools. Says to achieve the same sort of handwriting skills he has to bear down harder on his writing utensil and take his time. Says cognition is good and is not second-guessing that. Sleeping has been good and mood and behavior status quo and no history for psychosis or similar concerns. Took time to go over foods and additives that could compete with the Sinemet beyond protein such as iron and vitamin B6. Reminds me he has had this disease for over 4 years. Exam looked remarkably baseline and cites no new medical or surgical history. Has an excellent sense of self-preservation and awareness to keep himself out of trouble and I hope that continues to be the case. Revisit 6 months. Current Outpatient Medications   Medication Sig Dispense Refill    gabapentin (NEURONTIN) 300 mg capsule TAKE 2 CAPSULES BY MOUTH AT BEDTIME 180 Cap 1    carbidopa-levodopa (SINEMET)  mg per tablet TAKE 1 TABLET BY MOUTH 4 TIMES A  Tab 1    glipiZIDE (GLUCOTROL) 5 mg tablet Take 5 mg by mouth Daily (before breakfast).  metFORMIN (GLUCOPHAGE) 500 mg tablet Take 500 mg by mouth three (3) times daily (with meals).  fluticasone (FLONASE) 50 mcg/actuation nasal spray 2 Sprays by Both Nostrils route daily.  XARELTO 20 mg tab tablet 20 mg daily (with dinner).  Take one tablet by mouth daily  Indications: DEEP VENOUS THROMBOSIS  4    PREVALITE 4 gram packet Dissolve one packet in 2-6 ounces of water or juice daily  11    diphenhydrAMINE (BENADRYL) 25 mg capsule Take 25 mg by mouth daily (with lunch).  meloxicam (MOBIC) 15 mg tablet Take 15 mg by mouth daily.  GLUCOSAMINE HCL/CHONDR CARVER A NA (OSTEO BI-FLEX PO) Take 1 Tab by mouth daily.  ergocalciferol (VITAMIN D2) 50,000 unit capsule Take 50,000 Units by mouth. Takes on Friday      atorvastatin (LIPITOR) 40 mg tablet Take 40 mg by mouth daily (with dinner).  multivits,Stress Formula-Zinc tablet Take 1 Tab by mouth daily.  atenolol (TENORMIN) 50 mg tablet Take 50 mg by mouth daily.  aspirin delayed-release 81 mg tablet Take 81 mg by mouth daily.  omeprazole (PRILOSEC OTC) 20 mg tablet Take 20 mg by mouth daily.  PHENYLEPHRINE HCL (SINEX REGULAR NA) by Nasal route.         No Known Allergies  Past Medical History:   Diagnosis Date    Arthritis     CAD (coronary artery disease)     Chronic pain     joints    Diabetes ([address]     GERD (gastroesophageal reflux disease)     Hypertension     Ill-defined condition     hyperlipidemia    Ill-defined condition 11/2014    Parkinson's    MI, old 10/2000    Nausea & vomiting     aftr cardiac cath after MI- none since that time    Other ill-defined conditions(799.89)     L4-5 herniation    Thromboembolus ([address] 2006    Right calf DVT      Past Surgical History:   Procedure Laterality Date    CARDIAC SURG PROCEDURE UNLIST  2001,2009    pace maker    HX HEENT      bilateral cataract with IOL    HX HERNIA REPAIR Left 1968    inguinal    HX KNEE ARTHROSCOPY Right 2016    HX MOHS PROCEDURES Left 2012    HX ORTHOPAEDIC  2010    right rotator cuff repair    HX PACEMAKER      see card on paper medical record   [address]  2000,2001    stents x2      Social History     Socioeconomic History    Marital status:      Spouse name: Not on file    Number of children: Not on file    Years of education: Not on file    Highest education level: Not on file   Occupational History    Not on file   Social Needs  Financial resource strain: Not on Sun [employer] insecurity:     Worry: Not on file     Inability: Not on file    Transportation needs:     Medical: Not on file     Non-medical: Not on file   Tobacco Use    Smoking status: Former Smoker     Packs/day: 0.50     Years: 30.00     Pack years: 15.00     Last attempt to quit: 1995     Years since quittin.6    Smokeless tobacco: Never Used   Substance and Sexual Activity    Alcohol use: Yes     Alcohol/week: 4.0 standard drinks     Types: 2 Glasses of wine, 1 Cans of beer, 1 Shots of liquor per week    Drug use: No    Sexual activity: Not on file   Lifestyle    Physical activity:     Days per week: Not on file     Minutes per session: Not on file    Stress: Not on file   Relationships    Social connections:     Talks on phone: Not on file     Gets together: Not on file     Attends Anabaptism service: Not on file     Active member of club or organization: Not on file     Attends meetings of clubs or organizations: Not on file     Relationship status: Not on file    Intimate partner violence:     Fear of current or ex partner: Not on file     Emotionally abused: Not on file     Physically abused: Not on file     Forced sexual activity: Not on file   Other Topics Concern    Not on file   Social History Narrative    Not on file      Family History   Problem Relation Age of Onset    No Known Problems Mother     Heart Disease Father     No Known Problems Sister       Visit Vitals  /74   Pulse 62   Resp 18   Ht 6' (1.829 m)   Wt 110.4 kg (243 lb 6.4 oz)   SpO2 97%   BMI 33.01 kg/m²        Review of Systems:   A comprehensive review of systems was negative except for that written in the HPI. Neuro Exam:     Appearance: The patient is well developed, well nourished, provides a coherent history and is in no acute distress. Mental Status: Oriented to time, place and person. Mood and affect appropriate. Cranial Nerves:   Intact visual fields. Fundi are benign. MARGIE, EOM's full, no nystagmus, no ptosis. Facial sensation is normal. Corneal reflexes are intact. Facial movement is symmetric. Hearing is normal bilaterally. Palate is midline with normal sternocleidomastoid and trapezius muscles are normal. Tongue is midline. Motor:  5/5 strength in upper and lower proximal and distal muscles. Normal bulk and tone. No fasciculations. Reflexes:   Deep tendon reflexes 2+/4 and symmetrical.   Sensory:   Normal to touch, pinprick and vibration. Gait:  Normal gait. Tremor:    Patient with mild rest tremor noted of both hands. Cerebellar:  No cerebellar signs present. Neurovascular:  Normal heart sounds and regular rhythm, peripheral pulses intact, and no carotid bruits. Actually no detectable bradykinesia or rigidity on today's encounter. Assessment:   Parkinson's disease. Doing amazingly well and has had this disease for over 4 years. Stays reasonably busy but has a keen sense of self-awareness to keep himself out of trouble and multiple areas of daily life. We will keep Sinemet as is and recommend I see him back in 6 months. Took time to go over some dietary discretions as reminders to keeping the Sinemet as efficient as possible. Plan:   Revisit 6 months.   Signed by :  Tyler Petit MD

## 2019-09-02 RX ORDER — CARBIDOPA AND LEVODOPA 25; 100 MG/1; MG/1
TABLET ORAL
Qty: 360 TAB | Refills: 1 | Status: SHIPPED | OUTPATIENT
Start: 2019-09-02 | End: 2020-03-02

## 2020-02-19 ENCOUNTER — OFFICE VISIT (OUTPATIENT)
Dept: NEUROLOGY | Age: 70
End: 2020-02-19

## 2020-02-19 VITALS
RESPIRATION RATE: 18 BRPM | DIASTOLIC BLOOD PRESSURE: 66 MMHG | WEIGHT: 229.4 LBS | BODY MASS INDEX: 31.07 KG/M2 | HEIGHT: 72 IN | OXYGEN SATURATION: 96 % | SYSTOLIC BLOOD PRESSURE: 124 MMHG | HEART RATE: 59 BPM

## 2020-02-19 DIAGNOSIS — G20 PARKINSON DISEASE (HCC): Primary | ICD-10-CM

## 2020-02-19 NOTE — PATIENT INSTRUCTIONS
Patient history reviewed patient examined. I admire the patient's focus to stay physically and mentally busy, and that is a strong attribute to maintaining his vigor in spite of the Parkinson's. No change in the Sinemet and will recommend a follow-up in 6 months. Get good sleep minimize stress and his regular exercise routine should be all bonus points.

## 2020-02-19 NOTE — PROGRESS NOTES
Neurology Consult      Subjective:      Yoshi Peralta is a 68 y.o. male who comes in today with established Parkinson's. As usual he stays busy both physically and mentally at home and elsewhere. Has not had any near falls or similar experiences. He stays on his regular immediate release Sinemet 4 times a day and has no tolerability issues. Interestingly 1 of his friends was promoting the benefits of CBD oil but I am not sure that I can endorse this and certainly not for his Parkinson's. Has occasional falling out of bed episodes but I currently cannot connect the dots on REM sleep behavior disorder or similar nonmotor issues. No new medical or surgical issues and suggest a revisit in 6 months. He remains very self-sufficient and I admire that about him from the first visit. Current Outpatient Medications   Medication Sig Dispense Refill    gabapentin (NEURONTIN) 300 mg capsule TAKE 2 CAPSULES BY MOUTH AT BEDTIME 180 Cap 0    carbidopa-levodopa (SINEMET)  mg per tablet TAKE 1 TABLET BY MOUTH FOUR TIMES A  Tab 1    glipiZIDE (GLUCOTROL) 5 mg tablet Take 5 mg by mouth Daily (before breakfast).  metFORMIN (GLUCOPHAGE) 500 mg tablet Take 500 mg by mouth three (3) times daily (with meals).  fluticasone (FLONASE) 50 mcg/actuation nasal spray 2 Sprays by Both Nostrils route daily.  XARELTO 20 mg tab tablet 20 mg daily (with dinner). Take one tablet by mouth daily  Indications: DEEP VENOUS THROMBOSIS  4    PREVALITE 4 gram packet Dissolve one packet in 2-6 ounces of water or juice daily  11    diphenhydrAMINE (BENADRYL) 25 mg capsule Take 25 mg by mouth daily (with lunch).  meloxicam (MOBIC) 15 mg tablet Take 15 mg by mouth daily.  GLUCOSAMINE HCL/CHONDR CARVER A NA (OSTEO BI-FLEX PO) Take 1 Tab by mouth daily.  ergocalciferol (VITAMIN D2) 50,000 unit capsule Take 50,000 Units by mouth.  Takes on Friday      atorvastatin (LIPITOR) 40 mg tablet Take 40 mg by mouth daily (with dinner).  multivits,Stress Formula-Zinc tablet Take 1 Tab by mouth daily.  atenolol (TENORMIN) 50 mg tablet Take 50 mg by mouth daily.  aspirin delayed-release 81 mg tablet Take 81 mg by mouth daily.  omeprazole (PRILOSEC OTC) 20 mg tablet Take 20 mg by mouth daily.  PHENYLEPHRINE HCL (SINEX REGULAR NA) by Nasal route.         No Known Allergies  Past Medical History:   Diagnosis Date    Arthritis     CAD (coronary artery disease)     Chronic pain     joints    Diabetes ([address]     GERD (gastroesophageal reflux disease)     Hypertension     Ill-defined condition     hyperlipidemia    Ill-defined condition 11/2014    Parkinson's    MI, old 10/2000    Nausea & vomiting     aftr cardiac cath after MI- none since that time    Other ill-defined conditions(799.89)     L4-5 herniation    [address] ([facility] [address] 2006    Right calf DVT      Past Surgical History:   Procedure Laterality Date    CARDIAC SURG PROCEDURE UNLIST  2001,2009    pace maker    HX HEENT      bilateral cataract with IOL    HX HERNIA REPAIR Left 1968    inguinal    HX KNEE ARTHROSCOPY Right 2016    HX MOHS PROCEDURES Left 2012    HX ORTHOPAEDIC  2010    right rotator cuff repair    HX PACEMAKER      see card on paper medical record    RIGHT HEART CATHETERIZATION  2000,2001    stents x2      Social History     Socioeconomic History    Marital status:      Spouse name: Not on file    Number of children: Not on file    Years of education: Not on file    Highest education level: Not on file   Occupational History    Not on file   Social Needs    Financial resource strain: Not on file    Food insecurity:     Worry: Not on file     Inability: Not on file    Transportation needs:     Medical: Not on file     Non-medical: Not on file   Tobacco Use    Smoking status: Former Smoker     Packs/day: 0.50     Years: 30.00     Pack years: 15.00     Last attempt to quit: 1/7/1995     Years since quittin.1    Smokeless tobacco: Never Used   Substance and Sexual Activity    Alcohol use: Yes     Alcohol/week: 4.0 standard drinks     Types: 2 Glasses of wine, 1 Cans of beer, 1 Shots of liquor per week    Drug use: No    Sexual activity: Not on file   Lifestyle    Physical activity:     Days per week: Not on file     Minutes per session: Not on file    Stress: Not on file   Relationships    Social connections:     Talks on phone: Not on file     Gets together: Not on file     Attends Zoroastrianism service: Not on file     Active member of club or organization: Not on file     Attends meetings of clubs or organizations: Not on file     Relationship status: Not on file    Intimate partner violence:     Fear of current or ex partner: Not on file     Emotionally abused: Not on file     Physically abused: Not on file     Forced sexual activity: Not on file   Other Topics Concern    Not on file   Social History Narrative    Not on file      Family History   Problem Relation Age of Onset    No Known Problems Mother     Heart Disease Father     No Known Problems Sister       Visit Vitals  /66   Pulse (!) 59   Resp 18   Ht 6' (1.829 m)   Wt 104.1 kg (229 lb 6.4 oz)   SpO2 96%   BMI 31.11 kg/m²        Review of Systems:   A comprehensive review of systems was negative except for that written in the HPI. Neuro Exam:     Appearance: The patient is well developed, well nourished, provides a coherent history and is in no acute distress. Mental Status: Oriented to time, place and person. Mood and affect appropriate. Cranial Nerves:   Intact visual fields. Fundi are benign. MARGIE, EOM's full, no nystagmus, no ptosis. Facial sensation is normal. Corneal reflexes are intact. Facial movement is symmetric. Hearing is normal bilaterally. Palate is midline with normal sternocleidomastoid and trapezius muscles are normal. Tongue is midline.    Motor:  5/5 strength in upper and lower proximal and distal muscles. Normal bulk and tone. No fasciculations. Reflexes:   Deep tendon reflexes 2+/4 and symmetrical.   Sensory:   Normal to touch, pinprick and vibration. Gait:  Normal gait. Tremor:   No tremor noted. No rigidity or bradykinesia and transfers are taken quite consistently smooth and efficient   Cerebellar:  No cerebellar signs present. Neurovascular:  Normal heart sounds and regular rhythm, peripheral pulses intact, and no carotid bruits. Assessment:   Parkinson's disease. Will continue on the simple 4 times a day immediate release formulation. He has a very take charge attitude at home and elsewhere and finds plenty of recent to stay busy both physically and mentally. Will not change anything at this point and admire his focus and suggest revisit in 6 months. Plan:   Revisit 6 months.   Signed by :  Machelle Benoit MD

## 2020-02-19 NOTE — LETTER
2/19/20 Patient: Dilia Escobar YOB: 1950 Date of Visit: 2/19/2020 Helen Dugan MD 
[address] [facility] [place] [address] VIA Facsimile: [phone] Dear Helen Dugan MD, Thank you for referring Mr. Willard Orosco to [facility] for evaluation. My notes for this consultation are attached. If you have questions, please do not hesitate to call me. I look forward to following your patient along with you.  
 
 
Sincerely, 
 
Lizabeth Barcenas MD

## 2020-03-02 RX ORDER — CARBIDOPA AND LEVODOPA 25; 100 MG/1; MG/1
TABLET ORAL
Qty: 360 TAB | Refills: 1 | Status: SHIPPED | OUTPATIENT
Start: 2020-03-02 | End: 2020-09-02

## 2020-08-19 ENCOUNTER — OFFICE VISIT (OUTPATIENT)
Dept: NEUROLOGY | Age: 70
End: 2020-08-19
Payer: MEDICARE

## 2020-08-19 VITALS
RESPIRATION RATE: 16 BRPM | DIASTOLIC BLOOD PRESSURE: 66 MMHG | SYSTOLIC BLOOD PRESSURE: 108 MMHG | HEIGHT: 72 IN | BODY MASS INDEX: 29.36 KG/M2 | OXYGEN SATURATION: 97 % | HEART RATE: 61 BPM | WEIGHT: 216.8 LBS | TEMPERATURE: 98 F

## 2020-08-19 DIAGNOSIS — G20 PARKINSON DISEASE (HCC): Primary | ICD-10-CM

## 2020-08-19 PROCEDURE — 3017F COLORECTAL CA SCREEN DOC REV: CPT | Performed by: SPECIALIST

## 2020-08-19 PROCEDURE — G8536 NO DOC ELDER MAL SCRN: HCPCS | Performed by: SPECIALIST

## 2020-08-19 PROCEDURE — G8427 DOCREV CUR MEDS BY ELIG CLIN: HCPCS | Performed by: SPECIALIST

## 2020-08-19 PROCEDURE — 99213 OFFICE O/P EST LOW 20 MIN: CPT | Performed by: SPECIALIST

## 2020-08-19 PROCEDURE — G8432 DEP SCR NOT DOC, RNG: HCPCS | Performed by: SPECIALIST

## 2020-08-19 PROCEDURE — G8419 CALC BMI OUT NRM PARAM NOF/U: HCPCS | Performed by: SPECIALIST

## 2020-08-19 PROCEDURE — 1101F PT FALLS ASSESS-DOCD LE1/YR: CPT | Performed by: SPECIALIST

## 2020-08-19 NOTE — PROGRESS NOTES
Neurology Consult      Subjective:      Gwen Alanis is a 65 y.o. male who comes in today with established idiopathic Parkinson's disease. He is on his usual and customary immediate release Sinemet 25/100 4 times daily. No downside. No history of falls. Interestingly he does have baseline alterations of smell and taste. 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 So far no cause-and-effect relations as to source have been made. He did suffer some visual loss but it could have been worse. Interestingly his med tab indicates he is on Xarelto and a daily baby aspirin. His cognition is good and it sounds like he stays very busy on his usual and customary schedule which is what he likes. I did not see anything new today and will suggest revisit in 6 months. Current Outpatient Medications   Medication Sig Dispense Refill    gabapentin (NEURONTIN) 300 mg capsule TAKE 2 CAPSULES BY MOUTH EVERY DAY AT BEDTIME 180 Cap 1    carbidopa-levodopa (SINEMET)  mg per tablet TAKE 1 TABLET BY MOUTH FOUR TIMES A  Tab 1    glipiZIDE (GLUCOTROL) 5 mg tablet Take 5 mg by mouth Daily (before breakfast).  metFORMIN (GLUCOPHAGE) 500 mg tablet Take 500 mg by mouth three (3) times daily (with meals).  fluticasone (FLONASE) 50 mcg/actuation nasal spray 2 Sprays by Both Nostrils route daily.  XARELTO 20 mg tab tablet 20 mg daily (with dinner). Take one tablet by mouth daily  Indications: DEEP VENOUS THROMBOSIS  4    diphenhydrAMINE (BENADRYL) 25 mg capsule Take 25 mg by mouth daily (with lunch).  meloxicam (MOBIC) 15 mg tablet Take 15 mg by mouth daily.  GLUCOSAMINE HCL/CHONDR CARVER A NA (OSTEO BI-FLEX PO) Take 1 Tab by mouth daily.  ergocalciferol (VITAMIN D2) 50,000 unit capsule Take 50,000 Units by mouth.  Takes on Friday      atorvastatin (LIPITOR) 40 mg tablet Take 40 mg by mouth daily (with dinner).  multivits,Stress Formula-Zinc tablet Take 1 Tab by mouth daily.  atenolol (TENORMIN) 50 mg tablet Take 50 mg by mouth daily.  aspirin delayed-release 81 mg tablet Take 81 mg by mouth daily.  PHENYLEPHRINE HCL (SINEX REGULAR NA) by Nasal route.  PREVALITE 4 gram packet Dissolve one packet in 2-6 ounces of water or juice daily  11    omeprazole (PRILOSEC OTC) 20 mg tablet Take 20 mg by mouth daily.         No Known Allergies  Past Medical History:   Diagnosis Date    Arthritis     CAD (coronary artery disease)     Chronic pain     joints    Clot     to right eye    Diabetes ([address]     GERD (gastroesophageal reflux disease)     Hypertension     Ill-defined condition     hyperlipidemia    Ill-defined condition 11/2014    Parkinson's    MI, old 10/2000    Nausea & vomiting     aftr cardiac cath after MI- none since that time    Other ill-defined conditions(799.89)     L4-5 herniation    Thromboembolus ([facility] [address] 2006    Right calf DVT      Past Surgical History:   Procedure Laterality Date    CARDIAC SURG PROCEDURE UNLIST  2001,2009    pace maker    HX HEENT      bilateral cataract with IOL    HX HERNIA REPAIR Left 1968    inguinal    HX KNEE ARTHROSCOPY Right 2016    HX MOHS PROCEDURES Left 2012    HX ORTHOPAEDIC  2010    right rotator cuff repair    HX PACEMAKER      see card on paper medical record   [address]  2000,2001    stents x2      Social History     Socioeconomic History    Marital status:      Spouse name: Not on file    Number of children: Not on file    Years of education: Not on file    Highest education level: Not on file   Occupational History    Not on file   Social Needs    Financial resource strain: Not on file    Food insecurity     Worry: Not on file     Inability: Not on file    Transportation needs     Medical: Not on file     Non-medical: Not on file   Tobacco Use    Smoking status: Former Smoker Packs/day: 0.50     Years: 30.00     Pack years: 15.00     Last attempt to quit: 1995     Years since quittin.6    Smokeless tobacco: Never Used   Substance and Sexual Activity    Alcohol use: Yes     Alcohol/week: 4.0 standard drinks     Types: 2 Glasses of wine, 1 Cans of beer, 1 Shots of liquor per week    Drug use: No    Sexual activity: Not on file   Lifestyle    Physical activity     Days per week: Not on file     Minutes per session: Not on file    Stress: Not on file   Relationships    Social connections     Talks on phone: Not on file     Gets together: Not on file     Attends [employer]ist service: Not on file     Active member of club or organization: Not on file     Attends meetings of clubs or organizations: Not on file     Relationship status: Not on file    Intimate partner violence     Fear of current or ex partner: Not on file     Emotionally abused: Not on file     Physically abused: Not on file     Forced sexual activity: Not on file   Other Topics Concern    Not on file   Social History Narrative    Not on file      Family History   Problem Relation Age of Onset    No Known Problems Mother     Heart Disease Father     No Known Problems Sister       Visit Vitals  /66   Pulse 61   Temp 98 °F (36.7 °C)   Resp 16   Ht 6' (1.829 m)   Wt 98.3 kg (216 lb 12.8 oz)   SpO2 97%   BMI 29.40 kg/m²        Review of Systems:   A comprehensive review of systems was negative except for that written in the HPI. Neuro Exam:     Appearance: The patient is well developed, well nourished, provides a coherent history and is in no acute distress. Mental Status: Oriented to time, place and person. Mood and affect appropriate. Cranial Nerves:   Intact visual fields. Fundi are benign. MARGIE, EOM's full, no nystagmus, no ptosis. Facial sensation is normal. Corneal reflexes are intact. Facial movement is symmetric. Hearing is normal bilaterally.  Palate is midline with normal sternocleidomastoid and trapezius muscles are normal. Tongue is midline. Motor:  5/5 strength in upper and lower proximal and distal muscles. Normal bulk and tone. No fasciculations. Reflexes:   Deep tendon reflexes 2+/4 and symmetrical.   Sensory:   Normal to touch, pinprick and vibration. Gait:  Normal gait. Tremor:   No tremor noted. No bradykinesia or rigidity or difficulty with transfers etc.   Cerebellar:  No cerebellar signs present. Neurovascular:  Normal heart sounds and regular rhythm, peripheral pulses intact, and no carotid bruits. Assessment:   Idiopathic Parkinson's disease. Continue on the Sinemet 4 times daily dosing and stay as reasonably and safely busy as possible. Did not see any inherent changes in his exam and hopefully there can be some better resolution on the origin of the blood clot that went to his right eye. Plan:   Revisit 6 months.   Signed by :  Carmen Rhodes MD

## 2020-08-19 NOTE — PATIENT INSTRUCTIONS
Patient history reviewed patient examined. We will continue with the Sinemet as previously scripted and stay as reasonably safe and busy as possible. Looks like he is functional in his current capacity and on top of that stays active.

## 2020-08-19 NOTE — LETTER
8/19/20 Patient: Saint Apley YOB: 1950 Date of Visit: 8/19/2020 Tatyana Pedroza MD 
[address] [facility] [address] VIA Facsimile: [phone] Dear Tatyana Pedroza MD, Thank you for referring Mr. Abdifatah Lopes to [facility] for evaluation. My notes for this consultation are attached. If you have questions, please do not hesitate to call me. I look forward to following your patient along with you.  
 
 
Sincerely, 
 
Ibrahima Virgen MD 
 

sore nipples

## 2020-09-02 RX ORDER — CARBIDOPA AND LEVODOPA 25; 100 MG/1; MG/1
TABLET ORAL
Qty: 360 TAB | Refills: 1 | Status: SHIPPED | OUTPATIENT
Start: 2020-09-02 | End: 2021-01-04

## 2020-10-09 DIAGNOSIS — M79.2 NEUROPATHIC PAIN: ICD-10-CM

## 2020-10-10 RX ORDER — GABAPENTIN 300 MG/1
CAPSULE ORAL
Qty: 180 CAP | Refills: 1 | Status: SHIPPED | OUTPATIENT
Start: 2020-10-10 | End: 2021-03-21

## 2021-02-17 ENCOUNTER — OFFICE VISIT (OUTPATIENT)
Dept: NEUROLOGY | Age: 71
End: 2021-02-17
Payer: MEDICARE

## 2021-02-17 VITALS — OXYGEN SATURATION: 96 % | HEART RATE: 58 BPM | DIASTOLIC BLOOD PRESSURE: 80 MMHG | SYSTOLIC BLOOD PRESSURE: 140 MMHG

## 2021-02-17 DIAGNOSIS — M79.2 NEUROPATHIC PAIN: ICD-10-CM

## 2021-02-17 DIAGNOSIS — G20 PARKINSON DISEASE (HCC): Primary | ICD-10-CM

## 2021-02-17 PROCEDURE — 99214 OFFICE O/P EST MOD 30 MIN: CPT | Performed by: SPECIALIST

## 2021-02-17 PROCEDURE — 1101F PT FALLS ASSESS-DOCD LE1/YR: CPT | Performed by: SPECIALIST

## 2021-02-17 PROCEDURE — G8419 CALC BMI OUT NRM PARAM NOF/U: HCPCS | Performed by: SPECIALIST

## 2021-02-17 PROCEDURE — G8427 DOCREV CUR MEDS BY ELIG CLIN: HCPCS | Performed by: SPECIALIST

## 2021-02-17 PROCEDURE — 3017F COLORECTAL CA SCREEN DOC REV: CPT | Performed by: SPECIALIST

## 2021-02-17 PROCEDURE — G8432 DEP SCR NOT DOC, RNG: HCPCS | Performed by: SPECIALIST

## 2021-02-17 PROCEDURE — G8536 NO DOC ELDER MAL SCRN: HCPCS | Performed by: SPECIALIST

## 2021-02-17 NOTE — PROGRESS NOTES
Neurology Consult      Subjective:      Paulie Clifton is a 65 y.o. male who comes in today with baseline Parkinson's. Is on Sinemet immediate release 25/100 4 times daily. No falls bowel and bladder function is status quo smell and taste slightly diminished sleep is good bulbar function intact and cognition is great. On the sleep end of things has noticed that he will occasionally get a rude twinge of pain in the toes of the feet and this probably defaults to diabetic neuropathic pain. Is currently on gabapentin 300 mg 2 at night and I suggest increasing to 3 at night and see how that works. The other option could have been Gralise 600 mg sustained release. Other options include switching to Lyrica Cymbalta nortriptyline Effexor etc. says he is in the process of trying to better contain his hemoglobin A1c. He tries to stay busy and I congratulated him on that. Exam looks baseline and will see him back in 6 months. Current Outpatient Medications   Medication Sig Dispense Refill    carbidopa-levodopa (SINEMET)  mg per tablet TAKE 1 TABLET BY MOUTH FOUR TIMES A  Tab 0    gabapentin (NEURONTIN) 300 mg capsule TAKE 2 CAPSULES BY MOUTH AT BEDTIME 180 Cap 1    glipiZIDE (GLUCOTROL) 5 mg tablet Take 5 mg by mouth Daily (before breakfast).  metFORMIN (GLUCOPHAGE) 500 mg tablet Take 500 mg by mouth three (3) times daily (with meals).  fluticasone (FLONASE) 50 mcg/actuation nasal spray 2 Sprays by Both Nostrils route daily.  XARELTO 20 mg tab tablet 20 mg daily (with dinner). Take one tablet by mouth daily  Indications: DEEP VENOUS THROMBOSIS  4    diphenhydrAMINE (BENADRYL) 25 mg capsule Take 25 mg by mouth daily (with lunch).  meloxicam (MOBIC) 15 mg tablet Take 15 mg by mouth daily.  GLUCOSAMINE HCL/CHONDR CARVER A NA (OSTEO BI-FLEX PO) Take 1 Tab by mouth daily.  ergocalciferol (VITAMIN D2) 50,000 unit capsule Take 50,000 Units by mouth.  Takes on Friday      atorvastatin (LIPITOR) 40 mg tablet Take 40 mg by mouth daily (with dinner).  multivits,Stress Formula-Zinc tablet Take 1 Tab by mouth daily.  atenolol (TENORMIN) 50 mg tablet Take 50 mg by mouth daily.  aspirin delayed-release 81 mg tablet Take 81 mg by mouth daily.  PHENYLEPHRINE HCL (SINEX REGULAR NA) by Nasal route.  PREVALITE 4 gram packet Dissolve one packet in 2-6 ounces of water or juice daily  11    omeprazole (PRILOSEC OTC) 20 mg tablet Take 20 mg by mouth daily.         No Known Allergies  Past Medical History:   Diagnosis Date    Arthritis     CAD (coronary artery disease)     Chronic pain     joints    Clot     to right eye    Diabetes ([address]     GERD (gastroesophageal reflux disease)     Hypertension     Ill-defined condition     hyperlipidemia    Ill-defined condition 11/2014    Parkinson's    MI, old 10/2000    Nausea & vomiting     aftr cardiac cath after MI- none since that time    Other ill-defined conditions(799.89)     L4-5 herniation    Thromboembolus ([address] 2006    Right calf DVT      Past Surgical History:   Procedure Laterality Date    HX HEENT      bilateral cataract with IOL    HX HERNIA REPAIR Left 1968    inguinal    HX KNEE ARTHROSCOPY Right 2016    HX MOHS PROCEDURES Left 2012    HX ORTHOPAEDIC  2010    right rotator cuff repair    HX PACEMAKER      see card on paper medical record    [place] CARDIAC SURG PROCEDURE UNLIST  2001,2009    pace maker    RIGHT HEART CATHETERIZATION  2000,2001    stents x2      Social History     Socioeconomic History    Marital status:      Spouse name: Not on file    Number of children: Not on file    Years of education: Not on file    Highest education level: Not on file   Occupational History    Not on file   Social Needs    Financial resource strain: Not on file    Food insecurity     Worry: Not on file     Inability: Not on file    Transportation needs     Medical: Not on file     Non-medical: Not on file   Tobacco Use    Smoking status: Former Smoker     Packs/day: 0.50     Years: 30.00     Pack years: 15.00     Quit date: 1995     Years since quittin.1    Smokeless tobacco: Never Used   Substance and Sexual Activity    Alcohol use: Yes     Alcohol/week: 4.0 standard drinks     Types: 2 Glasses of wine, 1 Cans of beer, 1 Shots of liquor per week    Drug use: No    Sexual activity: Not on file   Lifestyle    Physical activity     Days per week: Not on file     Minutes per session: Not on file    Stress: Not on file   Relationships    Social connections     Talks on phone: Not on file     Gets together: Not on file     Attends Synagogue service: Not on file     Active member of club or organization: Not on file     Attends meetings of clubs or organizations: Not on file     Relationship status: Not on file    Intimate partner violence     Fear of current or ex partner: Not on file     Emotionally abused: Not on file     Physically abused: Not on file     Forced sexual activity: Not on file   Other Topics Concern    Not on file   Social History Narrative    Not on file      Family History   Problem Relation Age of Onset    No Known Problems Mother     Heart Disease Father     No Known Problems Sister       Visit Vitals  BP (!) 140/80   Pulse (!) 58   SpO2 96%        Review of Systems:   A comprehensive review of systems was negative except for that written in the HPI. Neuro Exam:     Appearance: The patient is well developed, well nourished, provides a coherent history and is in no acute distress. Mental Status: Oriented to time, place and person. Mood and affect appropriate. Cranial Nerves:   Intact visual fields. Fundi are benign. MARGIE, EOM's full, no nystagmus, no ptosis. Facial sensation is normal. Corneal reflexes are intact. Facial movement is symmetric. Hearing is normal bilaterally.  Palate is midline with normal sternocleidomastoid and trapezius muscles are normal. Tongue is midline. Motor:  5/5 strength in upper and lower proximal and distal muscles. Normal bulk and tone. No fasciculations. Reflexes:   Deep tendon reflexes 1-2+/4 and symmetrical.   Sensory:    Diminished distally to touch, pinprick and vibration. Gait:  Normal gait although he tends to be slightly stooped shouldered step to step. .   Tremor:    Had a faint rest tremor in the hands today. Slight rigidity right upper extremity and no real bradykinesia noted. Cerebellar:  No cerebellar signs present. Neurovascular:  Normal heart sounds and regular rhythm, peripheral pulses intact, and no carotid bruits. Assessment:   Problem 1 Parkinson's disease. Seems to be at his proverbial baseline on Sinemet immediate release 25/100 4 times daily. We will make no changes there and he stays very busy as I hope he is. Problem 2 nocturnal foot and toe pain. Suspect this defaults to diabetic neuropathic pain. Is working on keeping his hemoglobin A1c down. Is already on gabapentin 300 mg taking 2 at night and I suggest increasing to 3 at night. The other option would have been administering Gralise or extended release gabapentin in its place. We will try the 3 at night routine with the existing gabapentin let me know if it helps. If it does we can release a new prescription regarding the new quantity and directions. Other possibilities include switching out gabapentin in favor of Lyrica or considering nortriptyline or Cymbalta or similar preventatives. Plan:   Revisit 6 months.   Signed by :  Amber Canada MD

## 2021-02-17 NOTE — PATIENT INSTRUCTIONS
Patient history reviewed patient examined. We will continue on the Sinemet as previously prescribed 4 times a day. And on the toe pain at night it sounds like it defaults to a diabetic neuropathy type of situation. Is working on keeping his hemoglobin A1c down. Is welcome to try increasing the dose of gabapentin at night from 2-3 of the 300 mg strength. If that seems to work let me know and I will reconfigure the new prescription. Will suggest catching up with each other in 6 months.

## 2021-02-17 NOTE — LETTER
2/17/2021 Patient: Caty Hilton YOB: 1950 Date of Visit: 2/17/2021 Darrow Epley, MD 
[address] [facility] [address] [address] Via Fax: [phone] Dear Darrow Epley, MD, Thank you for referring Mr. Leopoldo Buster to [facility] for evaluation. My notes for this consultation are attached. If you have questions, please do not hesitate to call me. I look forward to following your patient along with you.  
 
 
Sincerely, 
 
Merrick Garcia MD

## 2021-03-25 DIAGNOSIS — M79.2 NEUROPATHIC PAIN: ICD-10-CM

## 2021-03-25 NOTE — TELEPHONE ENCOUNTER
----- Message from [facility] sent at 3/25/2021  1:52 PM EDT -----  Regarding: /Refill  Caller (if not patient):Pt  Relationship of caller (if not patient):n/a  Best contact number(s):[phone]  Name of medication and dosage if known:gabapentin 300mg  Is patient out of this medication (yes/no):Yes  Pharmacy name:[facility]  Pharmacy listed in chart? (yes/no):Yes  Pharmacy phone number:[phone]  Date of last visit:2.17.21  Details to clarify the request:n/a

## 2021-03-25 NOTE — TELEPHONE ENCOUNTER
Pt states dr. Amauri Franco advised him to increase the gabapentin 300 mg from 2 QHS to 3 QHS. Pt states this is really helping sx. Last time this was sent was on 3/21 and it was still for 2 QHS.   Pended dose for 3 QHS

## 2021-03-26 RX ORDER — GABAPENTIN 300 MG/1
900 CAPSULE ORAL
Qty: 270 CAP | Refills: 1 | Status: SHIPPED | OUTPATIENT
Start: 2021-03-26 | End: 2021-09-24

## 2021-08-07 RX ORDER — CARBIDOPA AND LEVODOPA 25; 100 MG/1; MG/1
TABLET ORAL
Qty: 360 TABLET | Refills: 1 | Status: SHIPPED | OUTPATIENT
Start: 2021-08-07 | End: 2022-03-25

## 2021-08-17 ENCOUNTER — OFFICE VISIT (OUTPATIENT)
Dept: NEUROLOGY | Age: 71
End: 2021-08-17
Payer: MEDICARE

## 2021-08-17 VITALS
HEIGHT: 72 IN | BODY MASS INDEX: 30.38 KG/M2 | DIASTOLIC BLOOD PRESSURE: 65 MMHG | HEART RATE: 59 BPM | WEIGHT: 224.3 LBS | RESPIRATION RATE: 16 BRPM | OXYGEN SATURATION: 96 % | SYSTOLIC BLOOD PRESSURE: 112 MMHG

## 2021-08-17 DIAGNOSIS — G20 PARKINSON'S DISEASE (HCC): Primary | ICD-10-CM

## 2021-08-17 DIAGNOSIS — E08.42 DIABETIC POLYNEUROPATHY ASSOCIATED WITH DIABETES MELLITUS DUE TO UNDERLYING CONDITION (HCC): ICD-10-CM

## 2021-08-17 PROCEDURE — G8417 CALC BMI ABV UP PARAM F/U: HCPCS | Performed by: SPECIALIST

## 2021-08-17 PROCEDURE — G8536 NO DOC ELDER MAL SCRN: HCPCS | Performed by: SPECIALIST

## 2021-08-17 PROCEDURE — 99213 OFFICE O/P EST LOW 20 MIN: CPT | Performed by: SPECIALIST

## 2021-08-17 PROCEDURE — G8432 DEP SCR NOT DOC, RNG: HCPCS | Performed by: SPECIALIST

## 2021-08-17 PROCEDURE — 3046F HEMOGLOBIN A1C LEVEL >9.0%: CPT | Performed by: SPECIALIST

## 2021-08-17 PROCEDURE — G8427 DOCREV CUR MEDS BY ELIG CLIN: HCPCS | Performed by: SPECIALIST

## 2021-08-17 PROCEDURE — 2022F DILAT RTA XM EVC RTNOPTHY: CPT | Performed by: SPECIALIST

## 2021-08-17 PROCEDURE — 1101F PT FALLS ASSESS-DOCD LE1/YR: CPT | Performed by: SPECIALIST

## 2021-08-17 PROCEDURE — 3017F COLORECTAL CA SCREEN DOC REV: CPT | Performed by: SPECIALIST

## 2021-08-17 NOTE — LETTER
8/17/2021    Patient: Jaqueline Ma   YOB: 1950   Date of Visit: 8/17/2021     Abhishek Umanzor MD  [address] [address]  Via Fax: [phone]    Dear Abhishek Umanzor MD,      Thank you for referring Mr. Lola Man to [facility] for evaluation. My notes for this consultation are attached. If you have questions, please do not hesitate to call me. I look forward to following your patient along with you.       Sincerely,    Roslynn Najjar, MD

## 2021-08-17 NOTE — PATIENT INSTRUCTIONS
Patient history viewed patient examined. We will keep medication as is and seems to be very self-sufficient despite the Parkinson's and painful diabetic neuropathy. I am hoping we can keep him on his feet and busy. Revisit 6 months.

## 2021-08-17 NOTE — PROGRESS NOTES
Aren Layne  Identified pt with two pt identifiers(name and ). Chief Complaint   Patient presents with    Tremors     6 mo follow up        1. Have you been to the ER, urgent care clinic since your last visit? Hospitalized since your last visit? NO    2. Have you seen or consulted any other health care providers outside of the [address] [place] [address] since your last visit? Include any pap smears or colon screening. NO      Provider notified of reason for visit, vitals and flowsheets obtained on patients.      Patient received paperwork for advance directive during previous visit but has not completed at this time     Reviewed record In preparation for visit, huddled with provider and have obtained necessary documentation      Health Maintenance Due   Topic    Hepatitis C Screening     Foot Exam Q1     A1C test (Diabetic or Prediabetic)     Eye Exam Retinal or Dilated     DTaP/Tdap/Td series (1 - Tdap)    Colorectal Cancer Screening Combo     Shingrix Vaccine Age 49> (1 of 2)    AAA Screening 73-67 YO Male Smoking Patients     Pneumococcal 65+ years (1 of 1 - PPSV23)    Medicare Yearly Exam     MICROALBUMIN Q1     Lipid Screen        Wt Readings from Last 3 Encounters:   21 101.7 kg (224 lb 4.8 oz)   20 98.3 kg (216 lb 12.8 oz)   20 104.1 kg (229 lb 6.4 oz)     Temp Readings from Last 3 Encounters:   20 98 °F (36.7 °C)   18 98.1 °F (36.7 °C) (Oral)   17 98.5 °F (36.9 °C)     BP Readings from Last 3 Encounters:   21 112/65   21 (!) 140/80   20 108/66     Pulse Readings from Last 3 Encounters:   21 (!) 59   21 (!) 58   20 61     Vitals:    21 1439   BP: 112/65   Pulse: (!) 59   Resp: 16   SpO2: 96%   Weight: 101.7 kg (224 lb 4.8 oz)   Height: 6' (1.829 m)   PainSc:   0 - No pain         Learning Assessment:  :     Learning Assessment 2015   PRIMARY LEARNER Patient Patient   PRIMARY LANGUAGE ENGLISH ENGLISH   LEARNER PREFERENCE PRIMARY READING READING   ANSWERED BY Jessica Petersen   RELATIONSHIP SELF SELF       Depression Screening:  :     No flowsheet data found. Fall Risk Assessment:  :     Fall Risk Assessment, last 12 mths 8/14/2015   Able to walk? Yes   Fall in past 12 months? No       Abuse Screening:  :     No flowsheet data found. ADL Screening:  :     No flowsheet data found. Medication reconciliation up to date and corrected with patient at this time. HSQ for DVT ppx  Dash diet

## 2021-08-17 NOTE — PROGRESS NOTES
Neurology Consult      Subjective:      Rogelio Womack is a 66 y.o. male who comes in today on follow-up of idiopathic Parkinson's disease and neuropathic pain associated with diabetic neuropathy. Seems to be doing all he wants to and I am sure will be working with his on club up in [facility] soon enough. Does not describe any falling or any impairment of activities of daily living. Bowel and bladder function is good and cognition is good. Did reference quickly the drug Nuplazid which is FDA approved for Parkinson psychosis and hopefully that will never have to be a consideration for him. [place] hopefully that will continue to be the case. Also has nightly neuropathic pain from diabetic neuropathy. Is on gabapentin 300 mg taking 3 at night and that is the magic number and timeframe to keep himself comfortable. Blood sugars averaging between 110 and 135 or thereabouts. I will see him back in 6 months and if the weather is bad can certainly reschedule. Current Outpatient Medications   Medication Sig Dispense Refill    carbidopa-levodopa (SINEMET)  mg per tablet TAKE 1 TABLET BY MOUTH FOUR TIMES A  Tablet 1    gabapentin (NEURONTIN) 300 mg capsule Take 3 Caps by mouth nightly. Max Daily Amount: 900 mg. 270 Cap 1    glipiZIDE (GLUCOTROL) 5 mg tablet Take 5 mg by mouth Daily (before breakfast).  metFORMIN (GLUCOPHAGE) 500 mg tablet Take 500 mg by mouth three (3) times daily (with meals).  fluticasone (FLONASE) 50 mcg/actuation nasal spray 2 Sprays by Both Nostrils route daily.  XARELTO 20 mg tab tablet 20 mg daily (with dinner). Take one tablet by mouth daily  Indications: DEEP VENOUS THROMBOSIS  4    diphenhydrAMINE (BENADRYL) 25 mg capsule Take 25 mg by mouth daily (with lunch).  meloxicam (MOBIC) 15 mg tablet Take 15 mg by mouth daily.  GLUCOSAMINE HCL/CHONDR CARVER A NA (OSTEO BI-FLEX PO) Take 1 Tab by mouth daily.  ergocalciferol (VITAMIN D2) 50,000 unit capsule Take 50,000 Units by mouth. Takes on Friday      atorvastatin (LIPITOR) 40 mg tablet Take 40 mg by mouth daily (with dinner).  multivits,Stress Formula-Zinc tablet Take 1 Tab by mouth daily.  atenolol (TENORMIN) 50 mg tablet Take 50 mg by mouth daily.  aspirin delayed-release 81 mg tablet Take 81 mg by mouth daily.  PHENYLEPHRINE HCL (SINEX REGULAR NA) by Nasal route. (Patient not taking: Reported on 8/17/2021)      PREVALITE 4 gram packet Dissolve one packet in 2-6 ounces of water or juice daily (Patient not taking: Reported on 8/17/2021)  11    omeprazole (PRILOSEC OTC) 20 mg tablet Take 20 mg by mouth daily.  (Patient not taking: Reported on 8/17/2021)        No Known Allergies  Past Medical History:   Diagnosis Date    Arthritis     CAD (coronary artery disease)     Chronic pain     joints    Clot     to right eye    Diabetes ([address]     GERD (gastroesophageal reflux disease)     Hypertension     Ill-defined condition     hyperlipidemia    Ill-defined condition 11/2014    Parkinson's    MI, old 10/2000    Nausea & vomiting     aftr cardiac cath after MI- none since that time    Other ill-defined conditions(799.89)     L4-5 herniation    Thromboembolus ([address] 2006    Right calf DVT      Past Surgical History:   Procedure Laterality Date    HX HEENT      bilateral cataract with IOL    HX HERNIA REPAIR Left 1968    inguinal    HX KNEE ARTHROSCOPY Right 2016    HX MOHS PROCEDURES Left 2012    HX ORTHOPAEDIC  2010    right rotator cuff repair    HX PACEMAKER      see card on paper medical record    [place] CARDIAC SURG PROCEDURE UNLIST  2001,2009    pace maker    RIGHT HEART CATHETERIZATION  2000,2001    stents x2      Social History     Socioeconomic History    Marital status:      Spouse name: Not on file    Number of children: Not on file    Years of education: Not on file    Highest education level: Not on file Occupational History    Not on file   Tobacco Use    Smoking status: Former Smoker     Packs/day: 0.50     Years: 30.00     Pack years: 15.00     Quit date: 1995     Years since quittin.6    Smokeless tobacco: Never Used   Substance and Sexual Activity    Alcohol use: Yes     Alcohol/week: 4.0 standard drinks     Types: 2 Glasses of wine, 1 Cans of beer, 1 Shots of liquor per week    Drug use: No    Sexual activity: Not on file   Other Topics Concern    Not on file   Social History Narrative    Not on file     Social Determinants of Health     Financial Resource Strain:     Difficulty of Paying Living Expenses:    Food Insecurity:     Worried About Running Out of Food in the Last Year:     Ran Out of Food in the Last Year:    Transportation Needs:     Lack of Transportation (Medical):  Lack of Transportation (Non-Medical):    Physical Activity:     Days of Exercise per Week:     Minutes of Exercise per Session:    Stress:     Feeling of Stress :    Social Connections:     Frequency of Communication with Friends and Family:     Frequency of Social Gatherings with Friends and Family:     Attends Buddhist Services:     Active Member of Clubs or Organizations:     Attends Club or Organization Meetings:     Marital Status:    Intimate Partner Violence:     Fear of Current or Ex-Partner:     Emotionally Abused:     Physically Abused:     Sexually Abused:       Family History   Problem Relation Age of Onset    No Known Problems Mother     Heart Disease Father     No Known Problems Sister       Visit Vitals  /65 (BP 1 Location: Left upper arm, BP Patient Position: Sitting)   Pulse (!) 59   Resp 16   Ht 6' (1.829 m)   Wt 101.7 kg (224 lb 4.8 oz)   SpO2 96%   BMI 30.42 kg/m²        Review of Systems:   A comprehensive review of systems was negative except for that written in the HPI. Neuro Exam:     Appearance:   The patient is well developed, well nourished, provides a coherent history and is in no acute distress. Mental Status: Oriented to time, place and person. Mood and affect appropriate. Cranial Nerves:   Intact visual fields. Fundi are benign. MARGIE, EOM's full, no nystagmus, no ptosis. Facial sensation is normal. Corneal reflexes are intact. Facial movement is symmetric. Hearing is normal bilaterally. Palate is midline with normal sternocleidomastoid and trapezius muscles are normal. Tongue is midline. Motor:  5/5 strength in upper and lower proximal and distal muscles. Normal bulk and tone. No fasciculations. Reflexes:   Deep tendon reflexes 1-2+/4 and symmetrical.   Sensory:    Diminished distally to touch, pinprick and vibration. Gait:  Normal gait. Tremor:   No tremor noted. Cerebellar:  No cerebellar signs present. Neurovascular:  Normal heart sounds and regular rhythm, peripheral pulses intact, and no carotid bruits. Assessment:   Problem 1 Parkinson's disease. We will keep the Sinemet dosed as is 25/100 4 times daily. Stays reasonably safely busy as possible but he already does that. Seems to have a good quality of life and we very briefly talked about the drug Nuplazid as noted above. Problem 2 diabetic neuropathy with neuropathic pain. We will keep the gabapentin dosed as is 300 mg taking 3 at night and that seems to keep him comfortable. Plan:   Revisit 6 months.   Signed by :  Linda Shepherd MD

## 2021-12-14 ENCOUNTER — HOSPITAL ENCOUNTER (OUTPATIENT)
Dept: RADIATION THERAPY | Age: 71
Discharge: HOME | End: 2021-12-14

## 2021-12-14 VITALS — HEIGHT: 72 IN | BODY MASS INDEX: 30.48 KG/M2 | WEIGHT: 225 LBS

## 2022-02-18 ENCOUNTER — OFFICE VISIT (OUTPATIENT)
Dept: NEUROLOGY | Age: 72
End: 2022-02-18
Payer: MEDICARE

## 2022-02-18 VITALS — OXYGEN SATURATION: 95 % | HEART RATE: 62 BPM | SYSTOLIC BLOOD PRESSURE: 128 MMHG | DIASTOLIC BLOOD PRESSURE: 80 MMHG

## 2022-02-18 DIAGNOSIS — Z86.69 HISTORY OF NERVE IMPINGEMENT: ICD-10-CM

## 2022-02-18 DIAGNOSIS — G20 PARKINSON'S DISEASE (HCC): Primary | ICD-10-CM

## 2022-02-18 PROCEDURE — 3017F COLORECTAL CA SCREEN DOC REV: CPT | Performed by: SPECIALIST

## 2022-02-18 PROCEDURE — 1101F PT FALLS ASSESS-DOCD LE1/YR: CPT | Performed by: SPECIALIST

## 2022-02-18 PROCEDURE — G8417 CALC BMI ABV UP PARAM F/U: HCPCS | Performed by: SPECIALIST

## 2022-02-18 PROCEDURE — G8432 DEP SCR NOT DOC, RNG: HCPCS | Performed by: SPECIALIST

## 2022-02-18 PROCEDURE — G8536 NO DOC ELDER MAL SCRN: HCPCS | Performed by: SPECIALIST

## 2022-02-18 PROCEDURE — G8427 DOCREV CUR MEDS BY ELIG CLIN: HCPCS | Performed by: SPECIALIST

## 2022-02-18 PROCEDURE — 99214 OFFICE O/P EST MOD 30 MIN: CPT | Performed by: SPECIALIST

## 2022-02-18 NOTE — PROGRESS NOTES
Neurology Consult      Subjective:      Meseret Adams is a 66 y.o. male who comes in today with established Parkinson's disease on Sinemet immediate release 25/100 4 times daily. No issues there no downside to taking the medicine no falls etc.  Did not have a great hunting season this past fall but could not blame it on the Parkinson's. Denied any cognitive issues up to this point and none that his wife has referenced except for some gentle reminders at home and nothing more than that. Also has underlying diabetic neuropathy which normally peaks at night in terms of neuropathic pain and is on gabapentin 300 mg 3 at night. No recent changes there. Has noticed that he will have some self-limited sensory changes that issue from the right neck to the shoulder and upper arm region. Offered him Multiview cervical spine in anticipation of seeing degenerative joint and disc disease and may be some inferred neuroforaminal encroachment. At this point its not pain and he politely declined. Brought me up to speed on his newer issue which is prostate cancer in recent months. Is currently getting radiation treatment and will have a follow-up PSA levels in the next 6 months. I thought he looks strictly baseline and wished him the best of luck on the prostate issue. Current Outpatient Medications   Medication Sig Dispense Refill    gabapentin (NEURONTIN) 300 mg capsule TAKE 3 CAPSULES BY MOUTH NIGHTLY 270 Capsule 1    carbidopa-levodopa (SINEMET)  mg per tablet TAKE 1 TABLET BY MOUTH FOUR TIMES A  Tablet 1    metFORMIN (GLUCOPHAGE) 500 mg tablet Take 500 mg by mouth three (3) times daily (with meals).  fluticasone (FLONASE) 50 mcg/actuation nasal spray 2 Sprays by Both Nostrils route daily.  XARELTO 20 mg tab tablet 20 mg daily (with dinner).  Take one tablet by mouth daily  Indications: DEEP VENOUS THROMBOSIS  4    diphenhydrAMINE (BENADRYL) 25 mg capsule Take 25 mg by mouth daily (with lunch).  meloxicam (MOBIC) 15 mg tablet Take 15 mg by mouth daily.  GLUCOSAMINE HCL/CHONDR CARVER A NA (OSTEO BI-FLEX PO) Take 1 Tab by mouth daily.  ergocalciferol (VITAMIN D2) 50,000 unit capsule Take 50,000 Units by mouth. Takes on Friday      atorvastatin (LIPITOR) 40 mg tablet Take 40 mg by mouth daily (with dinner).  multivits,Stress Formula-Zinc tablet Take 1 Tab by mouth daily.  atenolol (TENORMIN) 50 mg tablet Take 50 mg by mouth daily.  aspirin delayed-release 81 mg tablet Take 81 mg by mouth daily.  PHENYLEPHRINE HCL (SINEX REGULAR NA) by Nasal route. (Patient not taking: Reported on 8/17/2021)      glipiZIDE (GLUCOTROL) 5 mg tablet Take 5 mg by mouth Daily (before breakfast).  PREVALITE 4 gram packet Dissolve one packet in 2-6 ounces of water or juice daily (Patient not taking: Reported on 8/17/2021)  11    omeprazole (PRILOSEC OTC) 20 mg tablet Take 20 mg by mouth daily.  (Patient not taking: Reported on 8/17/2021)        No Known Allergies  Past Medical History:   Diagnosis Date    Arthritis     CAD (coronary artery disease)     Chronic pain     joints    Clot     to right eye    Diabetes ([address]     GERD (gastroesophageal reflux disease)     Hypertension     Ill-defined condition     hyperlipidemia    Ill-defined condition 11/2014    Parkinson's    MI, old 10/2000    Nausea & vomiting     aftr cardiac cath after MI- none since that time    Other ill-defined conditions(799.89)     L4-5 herniation    Thromboembolus ([address] 2006    Right calf DVT      Past Surgical History:   Procedure Laterality Date    HX HEENT      bilateral cataract with IOL    HX HERNIA REPAIR Left 1968    inguinal    HX KNEE ARTHROSCOPY Right 2016    HX MOHS PROCEDURES Left 2012    HX ORTHOPAEDIC  2010    right rotator cuff repair    HX PACEMAKER      see card on paper medical record    [place] CARDIAC SURG PROCEDURE UNLIST  2001,2009    pace maker   [address] [id number]    stents x2      Social History     Socioeconomic History    Marital status:      Spouse name: Not on file    Number of children: Not on file    Years of education: Not on file    Highest education level: Not on file   Occupational History    Not on file   Tobacco Use    Smoking status: Former Smoker     Packs/day: 0.50     Years: 30.00     Pack years: 15.00     Quit date: 1995     Years since quittin.1    Smokeless tobacco: Never Used   Substance and Sexual Activity    Alcohol use: Yes     Alcohol/week: 4.0 standard drinks     Types: 2 Glasses of wine, 1 Cans of beer, 1 Shots of liquor per week    Drug use: No    Sexual activity: Not on file   Other Topics Concern    Not on file   Social History Narrative    Not on file     Social Determinants of Health     Financial Resource Strain:     Difficulty of Paying Living Expenses: Not on file   Food Insecurity:     Worried About Running Out of Food in the Last Year: Not on file    [place] of Food in the Last Year: Not on file   Transportation Needs:     Lack of Transportation (Medical): Not on file    Lack of Transportation (Non-Medical):  Not on file   Physical Activity:     Days of Exercise per Week: Not on file    Minutes of Exercise per Session: Not on file   Stress:     Feeling of Stress : Not on file   Social Connections:     Frequency of Communication with Friends and Family: Not on file    Frequency of Social Gatherings with Friends and Family: Not on file    Attends Buddhism Services: Not on file    Active Member of Clubs or Organizations: Not on file    Attends Club or Organization Meetings: Not on file    Marital Status: Not on file   Intimate Partner Violence:     Fear of Current or Ex-Partner: Not on file    Emotionally Abused: Not on file    Physically Abused: Not on file    Sexually Abused: Not on file   Housing Stability:     Unable to Pay for Housing in the Last Year: Not on file    Number of Places Lived in the Last Year: Not on file    Unstable Housing in the Last Year: Not on file      Family History   Problem Relation Age of Onset    No Known Problems Mother     Heart Disease Father     No Known Problems Sister       Visit Vitals  /80 (BP 1 Location: Left arm, BP Patient Position: Sitting, BP Cuff Size: Adult)   Pulse 62   SpO2 95%        Review of Systems:   A comprehensive review of systems was negative except for that written in the HPI. Neuro Exam:     Appearance: The patient is well developed, well nourished, provides a coherent history and is in no acute distress. Mental Status: Oriented to time, place and person. Mood and affect appropriate. Cranial Nerves:   Intact visual fields. Fundi are benign. MARGIE, EOM's full, no nystagmus, no ptosis. Facial sensation is normal. Corneal reflexes are intact. Facial movement is symmetric. Hearing is normal bilaterally. Palate is midline with normal sternocleidomastoid and trapezius muscles are normal. Tongue is midline. Motor:  5/5 strength in upper and lower proximal and distal muscles. Normal bulk and tone. No fasciculations. Reflexes:   Deep tendon reflexes 1-2+/4 and symmetrical.   Sensory:    Diminished distally to touch, pinprick and vibration. Gait:  Normal gait. Transfers taken well. Tremor:    Had a very subtle rest and postural tremor in the hands. Had a minor element of rigidity right upper extremity. Did not feel I saw any bradykinesia today. Cerebellar:  No cerebellar signs present. Neurovascular:  Normal heart sounds and regular rhythm, peripheral pulses intact, and no carotid bruits. Assessment:   Parkinson's disease. We will continue on the Sinemet as previously directed 25/100 4 times daily. Stays reasonably busy both physically and cognitively as possible. Seems to be at his baseline fortunately. Cervical radiculopathy.   Sounds like he is having some self-contained sensory changes or paresthesias but not pain issuing from his neck to his upper arm and shoulder region etc.  Politely declined any imaging of the neck as and x-rays etc.  He will keep an eye on this. Diabetic neuropathy. Has not unexpected issues with neuropathic pain at night and is on gabapentin 300 mg 3 nightly. Plan:   Revisit 6 months.   Signed by :  Whitney Carbone MD

## 2022-02-18 NOTE — PATIENT INSTRUCTIONS
Patient history reviewed patient examined. Will continue with Sinemet dosing as is and has neuropathic diabetic neuropathy features on gabapentin 300 taking 3 at night. Seems to be moving about and otherwise engaged without any strict limitations at this time. Good luck with the prostate cancer and I think the symptoms that issue out of the right side of the neck to the arm are probably nerve irritation. Stay as reasonably busy both physically and mentally as possible. Revisit in 6 months.

## 2022-02-18 NOTE — LETTER
2/18/2022    Patient: Alysa Arteaga   YOB: 1950   Date of Visit: 2/18/2022     Esther Nguyễn MD  [address]  [address]  Via Fax: [phone]    Dear Esther Nguyễn MD,      Thank you for referring Mr. Dilan Pickering to [facility] for evaluation. My notes for this consultation are attached. If you have questions, please do not hesitate to call me. I look forward to following your patient along with you.       Sincerely,    Eloisa Short MD

## 2022-03-25 DIAGNOSIS — M79.2 NEUROPATHIC PAIN: ICD-10-CM

## 2022-03-25 RX ORDER — CARBIDOPA AND LEVODOPA 25; 100 MG/1; MG/1
TABLET ORAL
Qty: 360 TABLET | Refills: 1 | Status: SHIPPED | OUTPATIENT
Start: 2022-03-25 | End: 2022-09-06 | Stop reason: DRUGHIGH

## 2022-03-25 RX ORDER — GABAPENTIN 300 MG/1
CAPSULE ORAL
Qty: 270 CAPSULE | Refills: 1 | Status: SHIPPED | OUTPATIENT
Start: 2022-03-25 | End: 2022-09-20

## 2022-08-19 ENCOUNTER — OFFICE VISIT (OUTPATIENT)
Dept: NEUROLOGY | Age: 72
End: 2022-08-19
Payer: MEDICARE

## 2022-08-19 VITALS — HEART RATE: 58 BPM | SYSTOLIC BLOOD PRESSURE: 110 MMHG | DIASTOLIC BLOOD PRESSURE: 72 MMHG | OXYGEN SATURATION: 98 %

## 2022-08-19 DIAGNOSIS — G20 PARKINSON'S DISEASE (HCC): Primary | ICD-10-CM

## 2022-08-19 PROCEDURE — 99214 OFFICE O/P EST MOD 30 MIN: CPT | Performed by: SPECIALIST

## 2022-08-19 PROCEDURE — G8427 DOCREV CUR MEDS BY ELIG CLIN: HCPCS | Performed by: SPECIALIST

## 2022-08-19 PROCEDURE — G8417 CALC BMI ABV UP PARAM F/U: HCPCS | Performed by: SPECIALIST

## 2022-08-19 PROCEDURE — G8536 NO DOC ELDER MAL SCRN: HCPCS | Performed by: SPECIALIST

## 2022-08-19 PROCEDURE — G8432 DEP SCR NOT DOC, RNG: HCPCS | Performed by: SPECIALIST

## 2022-08-19 PROCEDURE — 3017F COLORECTAL CA SCREEN DOC REV: CPT | Performed by: SPECIALIST

## 2022-08-19 PROCEDURE — 1101F PT FALLS ASSESS-DOCD LE1/YR: CPT | Performed by: SPECIALIST

## 2022-08-19 PROCEDURE — 1123F ACP DISCUSS/DSCN MKR DOCD: CPT | Performed by: SPECIALIST

## 2022-08-19 RX ORDER — NORTRIPTYLINE HYDROCHLORIDE 25 MG/1
25 CAPSULE ORAL
Qty: 30 CAPSULE | Refills: 5 | Status: SHIPPED | OUTPATIENT
Start: 2022-08-19 | End: 2022-09-06 | Stop reason: DRUGHIGH

## 2022-08-19 NOTE — PROGRESS NOTES
Neurology Consult      Subjective:      Jason Trent is a 74 y.o. male who comes in today on Parkinson's and length dependent diabetic neuropathy painful. Says around lunchtime he has a little bit harder time getting going as the afternoon starts on fall with his Parkinson's rigidity bradykinesia etc.  Is currently on 4 times a day dosing so we will suggest an increase by doubling the Sinemet at lunchtime and 3 PM.  His sentiments not due for refill until later this month so he will give us a call as a reminder that would be very helpful. He knows his medicine can compete with protein and gets occasional nausea if he overdoes it. Is noticing as he approaches night that the neuropathic pain has increasing quality. Is already on gabapentin 300 mg 3 nightly. I am going to add the drug nortriptyline 25 mg at night as a preventative drug and was warned about sedation. Was also warned that it takes time to build up in the system as well. We can go to higher dosages as needed and best blood sugar control always advised. Has the attributes of length dependent peripheral neuropathy on exam again today and otherwise no new features. Current Outpatient Medications   Medication Sig Dispense Refill    nortriptyline (PAMELOR) 25 mg capsule Take 1 Capsule by mouth nightly. Indications: diabetic complication causing injury to some body nerves 30 Capsule 5    carbidopa-levodopa (SINEMET)  mg per tablet TAKE 1 TABLET BY MOUTH FOUR TIMES A  Tablet 1    gabapentin (NEURONTIN) 300 mg capsule TAKE 3 CAPSULES BY MOUTH NIGHTLY. 270 Capsule 1    glipiZIDE (GLUCOTROL) 5 mg tablet Take 5 mg by mouth Daily (before breakfast). metFORMIN (GLUCOPHAGE) 500 mg tablet Take 500 mg by mouth three (3) times daily (with meals). fluticasone (FLONASE) 50 mcg/actuation nasal spray 2 Sprays by Both Nostrils route daily. XARELTO 20 mg tab tablet 20 mg daily (with dinner).  Take one tablet by mouth daily Indications: DEEP VENOUS THROMBOSIS  4    diphenhydrAMINE (BENADRYL) 25 mg capsule Take 25 mg by mouth daily (with lunch). meloxicam (MOBIC) 15 mg tablet Take 15 mg by mouth daily. GLUCOSAMINE HCL/CHONDR CARVER A NA (OSTEO BI-FLEX PO) Take 1 Tab by mouth daily. ergocalciferol (ERGOCALCIFEROL) 1,250 mcg (50,000 unit) capsule Take 50,000 Units by mouth. Takes on Friday      atorvastatin (LIPITOR) 40 mg tablet Take 40 mg by mouth daily (with dinner). multivits,Stress Formula-Zinc tablet Take 1 Tab by mouth daily. atenolol (TENORMIN) 50 mg tablet Take 50 mg by mouth daily. aspirin delayed-release 81 mg tablet Take 81 mg by mouth daily.         No Known Allergies  Past Medical History:   Diagnosis Date    Arthritis     CAD (coronary artery disease)     Chronic pain     joints    Clot     to right eye    Diabetes ([address]     GERD (gastroesophageal reflux disease)     Hypertension     Ill-defined condition     hyperlipidemia    Ill-defined condition 11/2014    Parkinson's    MI, old 10/2000    Nausea & vomiting     aftr cardiac cath after MI- none since that time    Other ill-defined conditions(799.89)     L4-5 herniation    Thromboembolus ([address] 2006    Right calf DVT      Past Surgical History:   Procedure Laterality Date    HX HEENT      bilateral cataract with IOL    HX HERNIA REPAIR Left 1968    inguinal    HX KNEE ARTHROSCOPY Right 2016    HX MOHS PROCEDURES Left 2012    HX ORTHOPAEDIC  2010    right rotator cuff repair    HX PACEMAKER      see card on paper medical record    [address] [facility]  2001,2009    pace maker    RIGHT HEART CATHETERIZATION  2000,2001    stents x2      Social History     Socioeconomic History    Marital status:      Spouse name: Not on file    Number of children: Not on file    Years of education: Not on file    Highest education level: Not on file   Occupational History    Not on file   Tobacco Use    Smoking status: Former     Packs/day: 0.50 Years: 30.00     Pack years: 15.00     Types: Cigarettes     Quit date: 1995     Years since quittin.6    Smokeless tobacco: Never   Substance and Sexual Activity    Alcohol use: Yes     Alcohol/week: 4.0 standard drinks     Types: 2 Glasses of wine, 1 Cans of beer, 1 Shots of liquor per week    Drug use: No    Sexual activity: Not on file   Other Topics Concern    Not on file   Social History Narrative    Not on file     Social Determinants of Health     Financial Resource Strain: Not on file   Food Insecurity: Not on file   Transportation Needs: Not on file   Physical Activity: Not on file   Stress: Not on file   Social Connections: Not on file   Intimate Partner Violence: Not on file   Housing Stability: Not on file      Family History   Problem Relation Age of Onset    No Known Problems Mother     Heart Disease Father     No Known Problems Sister       Visit Vitals  /72 (BP 1 Location: Left upper arm, BP Patient Position: Sitting, BP Cuff Size: Adult)   Pulse (!) 58   SpO2 98%        Review of Systems:   A comprehensive review of systems was negative except for that written in the HPI. Neuro Exam:     Appearance: The patient is well developed, well nourished, provides a coherent history and is in no acute distress. Mental Status: Oriented to time, place and person. Mood and affect appropriate. Cranial Nerves:   Intact visual fields. Fundi are benign. MARGIE, EOM's full, no nystagmus, no ptosis. Facial sensation is normal. Corneal reflexes are intact. Facial movement is symmetric. Hearing is normal bilaterally. Palate is midline with normal sternocleidomastoid and trapezius muscles are normal. Tongue is midline. Motor:  5/5 strength in upper and lower proximal and distal muscles. Normal bulk and tone. No fasciculations. Reflexes:   Deep tendon reflexes 0-1+/4 and symmetrical.   Sensory:   Length dependent decrease to touch, pinprick and vibration. Gait:  Normal gait.    Tremor: Patient had his low amplitude left hand tremor as usual.  Did not denote any type of rigidity or bradykinesia and transfers and gait were taken well. Cerebellar:  No cerebellar signs present. Neurovascular:  Normal heart sounds and regular rhythm, peripheral pulses intact, and no carotid bruits. Assessment:   Parkinson's. To get him over the hump around noon and to make him more productive we will increase the Sinemet by doubling the dose at noon and 3 PM.  He is not due for a Sinemet refill until the end of this month so if he can give us a call and remind us on that change of dosing that would be very helpful. Fortunately he has no falls. I thought his exam today from a Parkinson's perspective was very much baseline. Neuropathic pain. Again no doubt diabetic and will suggest the addition of nortriptyline 25 mg at night with the gabapentin and was warned of sedation. Also advised that this can take some time for this drug to build up in his system so need to be patient. Best blood sugar control always advised. Plan:   Revisit 6 months.   Signed by :  Jami Dobson MD

## 2022-08-19 NOTE — LETTER
8/19/2022    Patient: Earl Rodriguez   YOB: 1950   Date of Visit: 8/19/2022     Jamel Isbell MD  [address]  [address]  Via Fax: [phone]    Dear Jamel Isbell MD,      Thank you for referring Mr. Alin Layne to [facility] for evaluation. My notes for this consultation are attached. If you have questions, please do not hesitate to call me. I look forward to following your patient along with you.       Sincerely,    Zoraida Dalton MD

## 2022-08-19 NOTE — PATIENT INSTRUCTIONS
Patient history viewed patient examined. On the 2 fronts of neuropathy and Parkinson's we will do the following. Respecting his midday slump with Parkinson's will suggest increasing the levodopa dosing at the noon hour or close there to and the 3 PM dose as well. Hopefully this will get him over the hump. On the nighttime neuropathic pain score we will add the drug nortriptyline 25 mg to assist the gabapentin and pain control. He was warned about sedation and to give it time to establish in his system and we can always go higher on the dose as needed. Best blood sugar control always advised. Revisit 6 months. If the weather is bad please reschedule.

## 2022-08-19 NOTE — PROGRESS NOTES
Tremors have been coming more often, having more bad days  Fine motor movement difficult  Remarked shooting pains in feet, but did say that its doing better

## 2022-08-29 LAB — HBA1C MFR BLD HPLC: 6.5 %

## 2022-09-06 ENCOUNTER — TELEPHONE (OUTPATIENT)
Dept: NEUROLOGY | Age: 72
End: 2022-09-06

## 2022-09-06 RX ORDER — NORTRIPTYLINE HYDROCHLORIDE 10 MG/1
10 CAPSULE ORAL
Qty: 30 CAPSULE | Refills: 5 | Status: SHIPPED | OUTPATIENT
Start: 2022-09-06

## 2022-09-06 RX ORDER — CARBIDOPA AND LEVODOPA 25; 100 MG/1; MG/1
TABLET ORAL
Qty: 180 TABLET | Refills: 5 | Status: SHIPPED | OUTPATIENT
Start: 2022-09-06

## 2022-09-06 NOTE — TELEPHONE ENCOUNTER
Patient calling about a change to his medications. (Carbidopa-levodopa and Nortriptyline)    Please call to discuss.

## 2022-09-20 DIAGNOSIS — M79.2 NEUROPATHIC PAIN: ICD-10-CM

## 2022-09-20 RX ORDER — GABAPENTIN 300 MG/1
CAPSULE ORAL
Qty: 270 CAPSULE | Refills: 1 | Status: SHIPPED | OUTPATIENT
Start: 2022-09-20

## 2022-11-09 RX ORDER — NORTRIPTYLINE HYDROCHLORIDE 10 MG/1
10 CAPSULE ORAL
Qty: 30 CAPSULE | Refills: 5 | Status: SHIPPED | OUTPATIENT
Start: 2022-11-09

## 2023-01-03 RX ORDER — CARBIDOPA AND LEVODOPA 25; 100 MG/1; MG/1
TABLET ORAL
Qty: 540 TABLET | Refills: 1 | Status: SHIPPED | OUTPATIENT
Start: 2023-01-03

## 2023-01-11 ENCOUNTER — TELEPHONE (OUTPATIENT)
Dept: NEUROLOGY | Age: 73
End: 2023-01-11

## 2023-01-12 RX ORDER — NORTRIPTYLINE HYDROCHLORIDE 10 MG/1
10 CAPSULE ORAL
Qty: 30 CAPSULE | Refills: 5 | Status: SHIPPED | OUTPATIENT
Start: 2023-01-12

## 2023-02-17 ENCOUNTER — OFFICE VISIT (OUTPATIENT)
Dept: NEUROLOGY | Age: 73
End: 2023-02-17
Payer: MEDICARE

## 2023-02-17 VITALS
OXYGEN SATURATION: 95 % | HEART RATE: 74 BPM | DIASTOLIC BLOOD PRESSURE: 80 MMHG | SYSTOLIC BLOOD PRESSURE: 130 MMHG | TEMPERATURE: 96 F

## 2023-02-17 DIAGNOSIS — M79.2 NEUROPATHIC PAIN: ICD-10-CM

## 2023-02-17 DIAGNOSIS — G20 PARKINSON'S DISEASE (HCC): Primary | ICD-10-CM

## 2023-02-17 RX ORDER — AMANTADINE HYDROCHLORIDE 100 MG/1
CAPSULE, GELATIN COATED ORAL
Qty: 60 CAPSULE | Refills: 5 | Status: SHIPPED | OUTPATIENT
Start: 2023-02-17

## 2023-02-17 RX ORDER — METOPROLOL SUCCINATE 50 MG/1
50 TABLET, EXTENDED RELEASE ORAL DAILY
COMMUNITY
Start: 2022-12-31

## 2023-02-17 NOTE — LETTER
2/17/2023    Patient: Arcelia Benavides   YOB: 1950   Date of Visit: 2/17/2023     Robin Thompson MD  [address] [facility] [address]  Via Fax: [phone]    Dear Robin Thompson MD,      Thank you for referring Mr. Michelle Mercer to [facility] for evaluation. My notes for this consultation are attached. If you have questions, please do not hesitate to call me. I look forward to following your patient along with you.       Sincerely,    Max Gotti MD

## 2023-02-17 NOTE — PROGRESS NOTES
Since med increase pt hasnt noticed much change  Said symptoms are getting a little worse even  Neuropathy is ok does have some pains at night, about 1 weekly  Said the pains typically do go away  Neuropathy mainly in the right side

## 2023-02-17 NOTE — PATIENT INSTRUCTIONS
Patient history viewed patient examined. We will keep the Sinemet dosed as before and in an effort to better control the tremor we will introduce another product amantadine. Can start off taking 1 in the morning and see how that does return to control and any potential side effects. If this does seem to work on both fronts can add a second dose in the later afternoon and go from there. We will continue with the gabapentin for neuropathic pain features with diabetes. Revisit in 6 months and stay safely busy. Been a pleasure working with patient and I hope he stays active and accomplished.

## 2023-02-17 NOTE — PROGRESS NOTES
Neurology Consult      Subjective:      Frank Salmeron is a 74 y.o. male who comes in today for follow-up of idiopathic Parkinson's disease and diabetic neuropathic pain. Says he still is bothered by the tremor which is more aggravating with skilled movements and is with handwriting especially in the morning. I told him the tremor could be a very tough customer to best control. We will add amantadine hydrochloride 100 mg in the morning and see how it goes to tolerability and effectiveness. If there is no pushback skin add a second dose in the afternoon. Otherwise we will stay on his Sinemet dosing as it currently goes to immediate release 25/100 1 on the first dose and 2 on the second dose and third dose and 25/100 on the last dosing. Says bulbar function intact as is cognition bowel and bladder and no psychosis features such as REM sleep behavior disorder delusions hallucinations etc.  No history of falls and said several years ago he lost his sense of smell and taste although does not recall any COVID encounters either. Has diabetic neuropathic pain for which he is on gabapentin 300 mg taking 3 at night and this currently suffices. Current Outpatient Medications   Medication Sig Dispense Refill    metoprolol succinate (TOPROL-XL) 50 mg XL tablet Take 50 mg by mouth daily. amantadine HCL (SYMMETREL) 100 mg capsule 1 p.o. twice daily  Indications: Parkinson's disease 60 Capsule 5    nortriptyline (PAMELOR) 10 mg capsule Take 1 Capsule by mouth nightly. 30 Capsule 5    carbidopa-levodopa (SINEMET)  mg per tablet TAKE 1 TABLET BY MOUTH IN MORNING, 2 TABS AT NOON, 2 TABS AT 3PM AND 1 TAB IN EVENING 540 Tablet 1    gabapentin (NEURONTIN) 300 mg capsule TAKE 3 CAPSULES BY MOUTH NIGHTLY. 270 Capsule 1    glipiZIDE (GLUCOTROL) 5 mg tablet Take 5 mg by mouth Daily (before breakfast). metFORMIN (GLUCOPHAGE) 500 mg tablet Take 500 mg by mouth three (3) times daily (with meals). fluticasone (FLONASE) 50 mcg/actuation nasal spray 2 Sprays by Both Nostrils route daily. XARELTO 20 mg tab tablet 20 mg daily (with dinner). Take one tablet by mouth daily  Indications: DEEP VENOUS THROMBOSIS  4    diphenhydrAMINE (BENADRYL) 25 mg capsule Take 25 mg by mouth daily (with lunch). meloxicam (MOBIC) 15 mg tablet Take 15 mg by mouth daily. GLUCOSAMINE HCL/CHONDR CARVER A NA (OSTEO BI-FLEX PO) Take 1 Tab by mouth daily. ergocalciferol (ERGOCALCIFEROL) 1,250 mcg (50,000 unit) capsule Take 50,000 Units by mouth. Takes on Friday      atorvastatin (LIPITOR) 40 mg tablet Take 40 mg by mouth daily (with dinner). multivits,Stress Formula-Zinc tablet Take 1 Tab by mouth daily. aspirin delayed-release 81 mg tablet Take 81 mg by mouth daily.         No Known Allergies  Past Medical History:   Diagnosis Date    Arthritis     CAD (coronary artery disease)     Chronic pain     joints    Clot     to right eye    Diabetes ([address]     GERD (gastroesophageal reflux disease)     Hypertension     Ill-defined condition     hyperlipidemia    Ill-defined condition 11/2014    Parkinson's    MI, old 10/2000    Nausea & vomiting     aftr cardiac cath after MI- none since that time    Other ill-defined conditions(799.89)     L4-5 herniation    Thromboembolus ([address] 2006    Right calf DVT      Past Surgical History:   Procedure Laterality Date    HX HEENT      bilateral cataract with IOL    HX HERNIA REPAIR Left 1968    inguinal    HX KNEE ARTHROSCOPY Right 2016    HX MOHS PROCEDURES Left 2012    HX ORTHOPAEDIC  2010    right rotator cuff repair    HX PACEMAKER      see card on paper medical record    [address] [facility]  2001,2009    pace maker    RIGHT HEART CATHETERIZATION  2000,2001    stents x2      Social History     Socioeconomic History    Marital status:      Spouse name: Not on file    Number of children: Not on file    Years of education: Not on file    Highest education level: Not on file   Occupational History    Not on file   Tobacco Use    Smoking status: Former     Packs/day: 0.50     Years: 30.00     Pack years: 15.00     Types: Cigarettes     Quit date: 1995     Years since quittin.1    Smokeless tobacco: Never   Substance and Sexual Activity    Alcohol use: Yes     Alcohol/week: 4.0 standard drinks     Types: 2 Glasses of wine, 1 Cans of beer, 1 Shots of liquor per week    Drug use: No    Sexual activity: Not on file   Other Topics Concern    Not on file   Social History Narrative    Not on file     Social Determinants of Health     Financial Resource Strain: Not on file   Food Insecurity: Not on file   Transportation Needs: Not on file   Physical Activity: Not on file   Stress: Not on file   Social Connections: Not on file   Intimate Partner Violence: Not on file   Housing Stability: Not on file      Family History   Problem Relation Age of Onset    No Known Problems Mother     Heart Disease Father     No Known Problems Sister       Visit Vitals  /80 (BP 1 Location: Left upper arm, BP Patient Position: Sitting, BP Cuff Size: Adult)   Pulse 74   Temp (!) 96 °F (35.6 °C)   SpO2 95%        Review of Systems:   A comprehensive review of systems was negative except for that written in the HPI. Neuro Exam:     Appearance: The patient is well developed, well nourished, provides a coherent history and is in no acute distress. Mental Status: Oriented to time, place and person. Mood and affect appropriate. Cranial Nerves:   Intact visual fields. Fundi are benign. MARGIE, EOM's full, no nystagmus, no ptosis. Facial sensation is normal. Corneal reflexes are intact. Facial movement is symmetric. Hearing is normal bilaterally. Palate is midline with normal sternocleidomastoid and trapezius muscles are normal. Tongue is midline. Motor:  5/5 strength in upper and lower proximal and distal muscles. Normal bulk and tone. No fasciculations.    Reflexes:   Deep tendon reflexes 0-1+/4 and symmetrical.   Sensory:   Length dependent depression below waist to touch, pinprick and vibration. Gait:  Normal gait although subtle stooped shouldered posturing only. .   Tremor: In the course of exam and while distracted I saw a subtle to mild amplitude rest tremor in the left hand greater than right. Interestingly no rigidity or bradykinesia present today   Cerebellar:  No cerebellar signs present. Neurovascular:  Normal heart sounds and regular rhythm, peripheral pulses intact, and no carotid bruits. Assessment:   Parkinson's disease. We will continue with his Sinemet dosing at 25/100 taking 1 in the morning the equivalent of 2 for the next dosing and the next dosing at 25/100 for the fourth dosing of the day. We will add the drug amantadine hydrochloride for hope for better suppression of tremor. We will start off with the morning dose to check response and side effects and if okay will add a second afternoon dose. Stay safely busy. Diabetic neuropathic pain. We will continue to take the gabapentin 300 mg 3 at night. Plan:   Revisit 6 months.   Signed by :  Kim Llamas MD

## 2023-02-28 LAB — HBA1C MFR BLD HPLC: 7.2 %

## 2023-03-08 ENCOUNTER — HOSPITAL ENCOUNTER (OUTPATIENT)
Dept: RADIATION THERAPY | Age: 73
Discharge: HOME | End: 2023-03-08

## 2023-03-08 DIAGNOSIS — R39.12 WEAK URINE STREAM: ICD-10-CM

## 2023-03-08 DIAGNOSIS — C61 PROSTATE CANCER (HCC): Primary | ICD-10-CM

## 2023-03-08 RX ORDER — ALFUZOSIN HYDROCHLORIDE 10 MG/1
10 TABLET, EXTENDED RELEASE ORAL DAILY
Qty: 30 TABLET | Refills: 6 | Status: SHIPPED | OUTPATIENT
Start: 2023-03-08

## 2023-04-20 DIAGNOSIS — M79.2 NEUROPATHIC PAIN: Primary | ICD-10-CM

## 2023-04-20 RX ORDER — NORTRIPTYLINE HYDROCHLORIDE 10 MG/1
10 CAPSULE ORAL
Qty: 90 CAPSULE | Refills: 1 | Status: SHIPPED | OUTPATIENT
Start: 2023-04-20

## 2023-06-22 DIAGNOSIS — C61 MALIGNANT NEOPLASM OF PROSTATE (HCC): ICD-10-CM

## 2023-06-22 DIAGNOSIS — R39.12 POOR URINARY STREAM: ICD-10-CM

## 2023-06-22 RX ORDER — ALFUZOSIN HYDROCHLORIDE 10 MG/1
TABLET, EXTENDED RELEASE ORAL
Qty: 90 TABLET | Refills: 2 | Status: SHIPPED | OUTPATIENT
Start: 2023-06-22

## 2023-07-10 DIAGNOSIS — G20 PARKINSON'S DISEASE (HCC): Primary | ICD-10-CM

## 2023-07-10 RX ORDER — AMANTADINE HYDROCHLORIDE 100 MG/1
CAPSULE, GELATIN COATED ORAL
Qty: 60 CAPSULE | Refills: 5 | Status: SHIPPED | OUTPATIENT
Start: 2023-07-10

## 2023-08-21 ENCOUNTER — OFFICE VISIT (OUTPATIENT)
Age: 73
End: 2023-08-21
Payer: MEDICARE

## 2023-08-21 VITALS
OXYGEN SATURATION: 95 % | HEART RATE: 78 BPM | DIASTOLIC BLOOD PRESSURE: 60 MMHG | RESPIRATION RATE: 16 BRPM | SYSTOLIC BLOOD PRESSURE: 93 MMHG

## 2023-08-21 DIAGNOSIS — G20 PARKINSON'S DISEASE (HCC): Primary | ICD-10-CM

## 2023-08-21 DIAGNOSIS — E11.42 DIABETIC POLYNEUROPATHY ASSOCIATED WITH TYPE 2 DIABETES MELLITUS (HCC): ICD-10-CM

## 2023-08-21 PROCEDURE — 3017F COLORECTAL CA SCREEN DOC REV: CPT | Performed by: PSYCHIATRY & NEUROLOGY

## 2023-08-21 PROCEDURE — 3046F HEMOGLOBIN A1C LEVEL >9.0%: CPT | Performed by: PSYCHIATRY & NEUROLOGY

## 2023-08-21 PROCEDURE — 2022F DILAT RTA XM EVC RTNOPTHY: CPT | Performed by: PSYCHIATRY & NEUROLOGY

## 2023-08-21 PROCEDURE — 4004F PT TOBACCO SCREEN RCVD TLK: CPT | Performed by: PSYCHIATRY & NEUROLOGY

## 2023-08-21 PROCEDURE — G8427 DOCREV CUR MEDS BY ELIG CLIN: HCPCS | Performed by: PSYCHIATRY & NEUROLOGY

## 2023-08-21 PROCEDURE — G8421 BMI NOT CALCULATED: HCPCS | Performed by: PSYCHIATRY & NEUROLOGY

## 2023-08-21 PROCEDURE — 99214 OFFICE O/P EST MOD 30 MIN: CPT | Performed by: PSYCHIATRY & NEUROLOGY

## 2023-08-21 PROCEDURE — 1123F ACP DISCUSS/DSCN MKR DOCD: CPT | Performed by: PSYCHIATRY & NEUROLOGY

## 2023-08-21 RX ORDER — LOSARTAN POTASSIUM 25 MG/1
25 TABLET ORAL DAILY
COMMUNITY
Start: 2023-06-19

## 2023-08-21 RX ORDER — NORTRIPTYLINE HYDROCHLORIDE 10 MG/1
10 CAPSULE ORAL NIGHTLY
Qty: 90 CAPSULE | Refills: 3 | Status: SHIPPED | OUTPATIENT
Start: 2023-08-21

## 2023-08-21 RX ORDER — EMPAGLIFLOZIN 25 MG/1
25 TABLET, FILM COATED ORAL EVERY MORNING
COMMUNITY
Start: 2023-06-30

## 2023-08-28 LAB — HBA1C MFR BLD HPLC: 8.3 %

## 2023-09-21 DIAGNOSIS — M79.2 NEURALGIA AND NEURITIS, UNSPECIFIED: ICD-10-CM

## 2023-09-21 DIAGNOSIS — E11.42 DIABETIC POLYNEUROPATHY ASSOCIATED WITH TYPE 2 DIABETES MELLITUS (HCC): Primary | ICD-10-CM

## 2023-09-21 RX ORDER — GABAPENTIN 300 MG/1
CAPSULE ORAL
Qty: 90 CAPSULE | Refills: 5 | Status: SHIPPED | OUTPATIENT
Start: 2023-09-21 | End: 2024-09-20

## 2023-11-22 DIAGNOSIS — G20.A1 PARKINSON'S DISEASE: ICD-10-CM

## 2023-11-24 RX ORDER — AMANTADINE HYDROCHLORIDE 100 MG/1
CAPSULE, GELATIN COATED ORAL
Qty: 180 CAPSULE | Refills: 1 | Status: SHIPPED | OUTPATIENT
Start: 2023-11-24

## 2024-02-01 LAB
HBA1C MFR BLD HPLC: 7.2 %
PSA, EXTERNAL: 0.16

## 2024-02-19 ENCOUNTER — OFFICE VISIT (OUTPATIENT)
Age: 74
End: 2024-02-19
Payer: MEDICARE

## 2024-02-19 VITALS
OXYGEN SATURATION: 98 % | DIASTOLIC BLOOD PRESSURE: 72 MMHG | HEART RATE: 80 BPM | BODY MASS INDEX: 21.7 KG/M2 | SYSTOLIC BLOOD PRESSURE: 114 MMHG | WEIGHT: 160 LBS

## 2024-02-19 DIAGNOSIS — M21.371 RIGHT FOOT DROP: Primary | ICD-10-CM

## 2024-02-19 DIAGNOSIS — E11.42 DIABETIC POLYNEUROPATHY ASSOCIATED WITH TYPE 2 DIABETES MELLITUS (HCC): ICD-10-CM

## 2024-02-19 DIAGNOSIS — G20.A1 PARKINSON'S DISEASE WITHOUT DYSKINESIA OR FLUCTUATING MANIFESTATIONS: ICD-10-CM

## 2024-02-19 DIAGNOSIS — M79.2 NEURALGIA AND NEURITIS, UNSPECIFIED: ICD-10-CM

## 2024-02-19 PROCEDURE — 3046F HEMOGLOBIN A1C LEVEL >9.0%: CPT | Performed by: PSYCHIATRY & NEUROLOGY

## 2024-02-19 PROCEDURE — 1123F ACP DISCUSS/DSCN MKR DOCD: CPT | Performed by: PSYCHIATRY & NEUROLOGY

## 2024-02-19 PROCEDURE — G8428 CUR MEDS NOT DOCUMENT: HCPCS | Performed by: PSYCHIATRY & NEUROLOGY

## 2024-02-19 PROCEDURE — 99214 OFFICE O/P EST MOD 30 MIN: CPT | Performed by: PSYCHIATRY & NEUROLOGY

## 2024-02-19 PROCEDURE — 2022F DILAT RTA XM EVC RTNOPTHY: CPT | Performed by: PSYCHIATRY & NEUROLOGY

## 2024-02-19 PROCEDURE — 3017F COLORECTAL CA SCREEN DOC REV: CPT | Performed by: PSYCHIATRY & NEUROLOGY

## 2024-02-19 PROCEDURE — G8484 FLU IMMUNIZE NO ADMIN: HCPCS | Performed by: PSYCHIATRY & NEUROLOGY

## 2024-02-19 PROCEDURE — 1036F TOBACCO NON-USER: CPT | Performed by: PSYCHIATRY & NEUROLOGY

## 2024-02-19 PROCEDURE — G8420 CALC BMI NORM PARAMETERS: HCPCS | Performed by: PSYCHIATRY & NEUROLOGY

## 2024-02-19 RX ORDER — AMANTADINE HYDROCHLORIDE 100 MG/1
CAPSULE, GELATIN COATED ORAL
Qty: 180 CAPSULE | Refills: 3 | Status: SHIPPED | OUTPATIENT
Start: 2024-02-19

## 2024-02-19 RX ORDER — NORTRIPTYLINE HYDROCHLORIDE 10 MG/1
10 CAPSULE ORAL NIGHTLY
Qty: 90 CAPSULE | Refills: 3 | Status: SHIPPED | OUTPATIENT
Start: 2024-02-19

## 2024-02-19 RX ORDER — GABAPENTIN 300 MG/1
CAPSULE ORAL
Qty: 270 CAPSULE | Refills: 1 | Status: SHIPPED | OUTPATIENT
Start: 2024-02-19 | End: 2025-02-18

## 2024-02-19 NOTE — PROGRESS NOTES
PD same since LOV  Neuropathy, said to have some problems, swelling in leg and some trouble walking/balance  Denies falls

## 2024-02-19 NOTE — PROGRESS NOTES
[facility] at [facility] at [address] [place] [address] [address] [place] [address] [phone]              Chief Complaint   Patient presents with    Follow-up     Current Outpatient Medications   Medication Sig Dispense Refill    amantadine (SYMMETREL) 100 MG capsule TAKE ONE CAPSULE BY MOUTH TWICE DAILY 180 capsule 1    gabapentin (NEURONTIN) 300 MG capsule TAKE 3 CAPSULES BY MOUTH NIGHTLY. 90 capsule 5    JARDIANCE 25 MG tablet Take 1 tablet by mouth every morning      losartan (COZAAR) 25 MG tablet Take 1 tablet by mouth daily      carbidopa-levodopa (SINEMET)  MG per tablet Take 2 tablets by mouth 4 times daily 720 tablet 3    nortriptyline (PAMELOR) 10 MG capsule Take 1 capsule by mouth nightly 90 capsule 3    aspirin 81 MG EC tablet Take 1 tablet by mouth daily      atorvastatin (LIPITOR) 40 MG tablet Take 1 tablet by mouth Daily with supper      diphenhydrAMINE (BENADRYL) 25 MG capsule Take 1 capsule by mouth Daily with lunch      ergocalciferol (ERGOCALCIFEROL) 1.25 MG (94944 UT) capsule Take 1 capsule by mouth      fluticasone (FLONASE) 50 MCG/ACT nasal spray 2 sprays by Nasal route daily      metFORMIN (GLUCOPHAGE) 500 MG tablet Take 1 tablet by mouth 2 times daily (with meals)      metoprolol succinate (TOPROL XL) 50 MG extended release tablet Take 1 tablet by mouth daily      rivaroxaban (XARELTO) 20 MG TABS tablet 1 tablet Daily with supper       No current facility-administered medications for this visit.      No Known Allergies  Social History     Tobacco Use    Smoking status: Former     Current packs/day: 0.00     Average packs/day: 0.3 packs/day for 15.0 years (3.7 ttl pk-yrs)     Types: Cigarettes     Start date: 1966     Quit date: 1981     Years since quittin.1    Smokeless tobacco: Never   Substance Use Topics    Alcohol

## 2024-03-07 ENCOUNTER — PROCEDURE VISIT (OUTPATIENT)
Age: 74
End: 2024-03-07

## 2024-03-07 DIAGNOSIS — R20.2 PARESTHESIA: Primary | ICD-10-CM

## 2024-03-07 NOTE — PROGRESS NOTES
AbdHallucis MedPlantar S1-2 Nml Nml Nml Nml Nml      Left AntTibialis Dp Br Peron L4-5 Nml Nml Nml Nml Nml Nml Nml    Left MedGastroc Tibial S1-2 Nml Nml Nml Nml Nml Nml Nml    Left VastusLat Femoral L2-4 Nml Nml Nml Nml Nml Nml Nml                Nerve Conduction Studies  Anti Sensory Left/Right Comparison     Stim Site L Lat (ms) R Lat (ms) L-R Lat (ms) L Amp (µV) R Amp (µV) L-R Amp (%) Site1 Site2 L Guillermo (m/s) R Guillermo (m/s) L-R Guillermo (m/s)   Sup Fibular Anti Sensory (Lat ankle)  31 °C   Lower leg       Lower leg Lat ankle      Sural Anti Sensory (Lat Mall)  30.4 °C   Calf       Calf Lat Mall        Motor Left/Right Comparison     Stim Site L Lat (ms) R Lat (ms) L-R Lat (ms) L Amp (mV) R Amp (mV) L-R Amp (%) Site1 Site2 L Guillermo (m/s) R Guillermo (m/s) L-R Guillermo (m/s)   Fibular Motor (Ext Dig Brev)  31.1 °C   Ankle 4.1 5.3 1.2 3.7 2.7 27.0 Ankle Ext Dig Brev      B Fib 12.8 13.5 0.7 2.3 2.7 14.8 B Fib Ankle 38 38 0   Poplt 15.2 16.9 1.7 3.0 1.8 40.0 Poplt B Fib 42 29 13   Fibular TA Motor (Tib Ant)  23.1 °C   Fib Head  3.4   3.1  Fib Head Tib Ant      Poplit  5.3   2.6  Poplit Fib Head  53      7.2   1.6         Tibial Motor (Abd Morillo Brev)  31.1 °C   Ankle 5.4 4.7 0.7 5.4 6.9 21.7 Ankle Abd Morillo Brev      Knee 15.2 14.9 0.3 4.2 4.5 6.7 Knee Ankle 39 39 0         Waveforms:

## 2024-03-19 ENCOUNTER — TELEPHONE (OUTPATIENT)
Age: 74
End: 2024-03-19

## 2024-03-19 DIAGNOSIS — G20.A1 PARKINSON'S DISEASE WITHOUT DYSKINESIA OR FLUCTUATING MANIFESTATIONS: ICD-10-CM

## 2024-03-19 DIAGNOSIS — G57.30 PERONEAL NERVE PALSY, UNSPECIFIED LATERALITY: ICD-10-CM

## 2024-03-19 DIAGNOSIS — M21.371 RIGHT FOOT DROP: Primary | ICD-10-CM

## 2024-03-20 NOTE — TELEPHONE ENCOUNTER
S/w pt, discussed Dr. Garcia reply that EMG showed peroneal palsy, refer to PT as there are no signs of lumbar issue.    Order placed to [facility] at [facility] per pt request and faxed along with EMG and OV notes.

## 2024-03-22 NOTE — TELEPHONE ENCOUNTER
Patient was returning call to provide location for PT.    [facility]   [address] [place], [place]     [phone]  [phone]

## 2024-03-22 NOTE — TELEPHONE ENCOUNTER
S/w pt, advised him to contact PT around his location to see who he would use, let us know, then will fax over order and note.

## 2024-03-22 NOTE — TELEPHONE ENCOUNTER
Patient requesting a return call. He stated [facility] will not take referrals unless it's coming from another Ortho doctor.

## 2024-08-19 ENCOUNTER — OFFICE VISIT (OUTPATIENT)
Age: 74
End: 2024-08-19
Payer: MEDICARE

## 2024-08-19 VITALS
RESPIRATION RATE: 16 BRPM | SYSTOLIC BLOOD PRESSURE: 122 MMHG | WEIGHT: 168.5 LBS | BODY MASS INDEX: 22.82 KG/M2 | HEART RATE: 70 BPM | HEIGHT: 72 IN | OXYGEN SATURATION: 96 % | DIASTOLIC BLOOD PRESSURE: 64 MMHG

## 2024-08-19 DIAGNOSIS — E11.42 DIABETIC POLYNEUROPATHY ASSOCIATED WITH TYPE 2 DIABETES MELLITUS (HCC): ICD-10-CM

## 2024-08-19 DIAGNOSIS — M79.2 NEURALGIA AND NEURITIS, UNSPECIFIED: ICD-10-CM

## 2024-08-19 DIAGNOSIS — G20.A1 PARKINSON'S DISEASE WITHOUT DYSKINESIA OR FLUCTUATING MANIFESTATIONS (HCC): Primary | ICD-10-CM

## 2024-08-19 PROCEDURE — 99214 OFFICE O/P EST MOD 30 MIN: CPT | Performed by: PSYCHIATRY & NEUROLOGY

## 2024-08-19 PROCEDURE — G8427 DOCREV CUR MEDS BY ELIG CLIN: HCPCS | Performed by: PSYCHIATRY & NEUROLOGY

## 2024-08-19 PROCEDURE — G8420 CALC BMI NORM PARAMETERS: HCPCS | Performed by: PSYCHIATRY & NEUROLOGY

## 2024-08-19 PROCEDURE — 1036F TOBACCO NON-USER: CPT | Performed by: PSYCHIATRY & NEUROLOGY

## 2024-08-19 PROCEDURE — 1123F ACP DISCUSS/DSCN MKR DOCD: CPT | Performed by: PSYCHIATRY & NEUROLOGY

## 2024-08-19 RX ORDER — RANOLAZINE 500 MG/1
500 TABLET, EXTENDED RELEASE ORAL 2 TIMES DAILY
COMMUNITY

## 2024-08-19 RX ORDER — FUROSEMIDE 40 MG/1
20 TABLET ORAL
COMMUNITY
Start: 2024-01-08

## 2024-08-19 RX ORDER — NITROGLYCERIN 0.4 MG/1
0.4 TABLET SUBLINGUAL EVERY 5 MIN PRN
COMMUNITY
Start: 2024-07-22

## 2024-08-19 RX ORDER — MAGNESIUM OXIDE 400 MG/1
1 TABLET ORAL 2 TIMES DAILY
COMMUNITY
Start: 2024-06-13

## 2024-08-19 RX ORDER — FLUOROURACIL 50 MG/G
CREAM TOPICAL AS NEEDED
COMMUNITY

## 2024-08-19 RX ORDER — GABAPENTIN 300 MG/1
CAPSULE ORAL
Qty: 270 CAPSULE | Refills: 1 | Status: SHIPPED | OUTPATIENT
Start: 2024-08-19 | End: 2025-08-19

## 2024-08-19 RX ORDER — SODIUM PHOSPHATE,MONO-DIBASIC 19G-7G/118
ENEMA (ML) RECTAL
COMMUNITY
Start: 2024-01-08

## 2024-08-19 ASSESSMENT — PATIENT HEALTH QUESTIONNAIRE - PHQ9
SUM OF ALL RESPONSES TO PHQ QUESTIONS 1-9: 0
SUM OF ALL RESPONSES TO PHQ9 QUESTIONS 1 & 2: 0
2. FEELING DOWN, DEPRESSED OR HOPELESS: NOT AT ALL
1. LITTLE INTEREST OR PLEASURE IN DOING THINGS: NOT AT ALL
SUM OF ALL RESPONSES TO PHQ QUESTIONS 1-9: 0

## 2024-08-19 NOTE — PROGRESS NOTES
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  GFR 43  Ferritin normal  Magnesium normal  Vitamin D 158  CBC unremarkable    Examination  Resp 16   Wt 76.4 kg (168 lb 8 oz)   BMI 22.85 kg/m²   He is a pleasant engaging gentleman.  He is awake alert and oriented.  He has normal speech and normal language.  Normal cognition.  He has mild rest tremor of the bilateral hands.  No cogwheeling.  Minimal bradykinesia.  He has a slight hunch.  He has arm swing.  He takes 3 step turn and is a bit tentative.  Anterior tibialis right side strong    Impression/Plan  Parkinson's and peripheral neuropathy with some increase in balance difficulty and we discussed this is likely multifactorial his Parkinson's causing some balance difficulty but then also with a peripheral neuropathy he can have sensory ataxia  Continue same doses of medication  Will send him to the [facility] big program  Follow-up 6    Damaris Garcia MD        This note was created using voice recognition software. Despite editing, there may be syntax errors.

## 2024-08-20 ENCOUNTER — CLINICAL DOCUMENTATION (OUTPATIENT)
Age: 74
End: 2024-08-20

## 2024-08-20 NOTE — PROGRESS NOTES
Faxed over PT to [facility] for [facility]. Faxed over to [facility].    Fax: [phone]

## 2024-09-06 ENCOUNTER — TELEPHONE (OUTPATIENT)
Age: 74
End: 2024-09-06

## 2024-09-06 DIAGNOSIS — M79.2 NEURALGIA AND NEURITIS, UNSPECIFIED: ICD-10-CM

## 2024-09-06 DIAGNOSIS — E11.42 DIABETIC POLYNEUROPATHY ASSOCIATED WITH TYPE 2 DIABETES MELLITUS (HCC): ICD-10-CM

## 2024-09-06 RX ORDER — GABAPENTIN 300 MG/1
CAPSULE ORAL
Qty: 270 CAPSULE | OUTPATIENT
Start: 2024-09-06

## 2024-09-06 NOTE — TELEPHONE ENCOUNTER
HIPAA Verified (if caller is someone other than patient): N/A       Message: (as many details from patient/caller as possible)  Patient requesting a refill     Next appointment   03/20/25    Patient stated he has no more pills left in hand.      Refill Information: Neurodiagnostic Testing: New patient referrals   Pharmacy:  [facility]     Test Type: N/A   Reason for referral:     Medication name: Gabapentin Reason for Test:   Has pt been seen by neuro in hospital or our offices within the last 3 yrs?  yes      Prescribing provider:    Ordering Provider:    Referring provider or self referral?     Last Office Visit (must be within last 12 months):     Referral Placed/Sent?: N/A  Referral placed in Epic or scanned in to media?  N/A          Level 1 Calls - attempted to reach practice? N/A     Reason Call Marked High Priority (if applicable):

## 2024-10-16 ENCOUNTER — HOSPITAL ENCOUNTER (OUTPATIENT)
Facility: HOSPITAL | Age: 74
Setting detail: RECURRING SERIES
Discharge: HOME | End: 2024-10-19
Payer: MEDICARE

## 2024-10-16 VITALS — WEIGHT: 171 LBS | BODY MASS INDEX: 23.94 KG/M2 | RESPIRATION RATE: 16 BRPM | HEIGHT: 71 IN | OXYGEN SATURATION: 96 %

## 2024-10-16 PROCEDURE — 93797 PHYS/QHP OP CAR RHAB WO ECG: CPT

## 2024-10-16 PROCEDURE — 93798 PHYS/QHP OP CAR RHAB W/ECG: CPT

## 2024-10-16 ASSESSMENT — NEW YORK HEART ASSOCIATION (NYHA) CLASSIFICATION: NYHA FUNCTIONAL CLASS: CLASS II

## 2024-10-16 ASSESSMENT — PATIENT HEALTH QUESTIONNAIRE - PHQ9
3. TROUBLE FALLING OR STAYING ASLEEP: SEVERAL DAYS
10. IF YOU CHECKED OFF ANY PROBLEMS, HOW DIFFICULT HAVE THESE PROBLEMS MADE IT FOR YOU TO DO YOUR WORK, TAKE CARE OF THINGS AT HOME, OR GET ALONG WITH OTHER PEOPLE: NOT DIFFICULT AT ALL
2. FEELING DOWN, DEPRESSED OR HOPELESS: NOT AT ALL
SUM OF ALL RESPONSES TO PHQ QUESTIONS 1-9: 4
7. TROUBLE CONCENTRATING ON THINGS, SUCH AS READING THE NEWSPAPER OR WATCHING TELEVISION: NOT AT ALL
SUM OF ALL RESPONSES TO PHQ QUESTIONS 1-9: 4
1. LITTLE INTEREST OR PLEASURE IN DOING THINGS: NOT AT ALL
8. MOVING OR SPEAKING SO SLOWLY THAT OTHER PEOPLE COULD HAVE NOTICED. OR THE OPPOSITE, BEING SO FIGETY OR RESTLESS THAT YOU HAVE BEEN MOVING AROUND A LOT MORE THAN USUAL: NOT AT ALL
5. POOR APPETITE OR OVEREATING: SEVERAL DAYS
4. FEELING TIRED OR HAVING LITTLE ENERGY: MORE THAN HALF THE DAYS
SUM OF ALL RESPONSES TO PHQ QUESTIONS 1-9: 4
SUM OF ALL RESPONSES TO PHQ9 QUESTIONS 1 & 2: 0
SUM OF ALL RESPONSES TO PHQ QUESTIONS 1-9: 4
9. THOUGHTS THAT YOU WOULD BE BETTER OFF DEAD, OR OF HURTING YOURSELF: NOT AT ALL
6. FEELING BAD ABOUT YOURSELF - OR THAT YOU ARE A FAILURE OR HAVE LET YOURSELF OR YOUR FAMILY DOWN: NOT AT ALL

## 2024-10-16 ASSESSMENT — EXERCISE STRESS TEST
PEAK_RPE: 11
PEAK_HR: 75
PEAK_METS: 2
PEAK_HR: 75
PEAK_BP: 136/72
PEAK_BP: 136/72

## 2024-10-16 ASSESSMENT — EJECTION FRACTION: EF_VALUE: 30

## 2024-10-16 NOTE — CARDIO/PULMONARY
INTAKE APPOINTMENT NOTE  10/16/2024    NAME: Darrell Bocanegra : 1950 AGE: 74 y.o.  GENDER: male    CARDIAC REHAB ADMITTING DIAGNOSIS: Chronic systolic (congestive) heart failure [I50.22]    REFERRING PHYSICIAN: Marilyn Ramirez*    MEDICAL HX:  Past Medical History:   Diagnosis Date    Arthritis     CAD (coronary artery disease)     MI , PCI LAD ,     Cancer ([facility]) 10/1/2021    Prostate cancer    Cerebral artery occlusion with cerebral infarction ([facility])     Blood clot in eye    Chronic pain     joints    Clot     to right eye    Diabetes ([facility])     GERD (gastroesophageal reflux disease)     Heart failure ([facility])     Hx of blood clots     RLE DVT, R eye retinal artery occlusion    Hyperlipidemia     Hypertension     Ill-defined condition     hyperlipidemia    Ill-defined condition 2014    Parkinson's    MI, old 10/2000    Nausea & vomiting     aftr cardiac cath after MI- none since that time    Neuropathy 2014    Shooting pain in foot    Other ill-defined conditions(799.89)     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 presented to Cardiac Rehab for program orientation and 6 minute walk test today with a primary diagnosis of Chronic systolic (congestive) heart failure [I50.22]. EF is 30 % (echo 23 - EF 30-35%) Cardiac risk factors include smoking/ tobacco exposure, family history, dyslipidemia, diabetes mellitus, hypertension, stress, prior MI.   Patient is a remote former smoker. He quit in  with his MI. He denies any

## 2024-10-22 ENCOUNTER — HOSPITAL ENCOUNTER (OUTPATIENT)
Facility: HOSPITAL | Age: 74
Setting detail: RECURRING SERIES
Discharge: HOME | End: 2024-10-25
Payer: MEDICARE

## 2024-10-22 VITALS — WEIGHT: 166.6 LBS | BODY MASS INDEX: 23.24 KG/M2

## 2024-10-22 PROCEDURE — 93798 PHYS/QHP OP CAR RHAB W/ECG: CPT

## 2024-10-22 ASSESSMENT — EXERCISE STRESS TEST
PEAK_RPE: 11
PEAK_METS: 2
PEAK_HR: 90

## 2024-10-24 ENCOUNTER — HOSPITAL ENCOUNTER (OUTPATIENT)
Facility: HOSPITAL | Age: 74
Setting detail: RECURRING SERIES
Discharge: HOME | End: 2024-10-27
Payer: MEDICARE

## 2024-10-24 VITALS — WEIGHT: 164.9 LBS | BODY MASS INDEX: 23 KG/M2

## 2024-10-24 PROCEDURE — 93798 PHYS/QHP OP CAR RHAB W/ECG: CPT

## 2024-10-24 PROCEDURE — 93797 PHYS/QHP OP CAR RHAB WO ECG: CPT

## 2024-10-24 ASSESSMENT — EXERCISE STRESS TEST
PEAK_METS: 2
PEAK_HR: 99
PEAK_RPE: 11

## 2024-10-24 NOTE — PROGRESS NOTES
Cardiac Rehab Nutrition Assessment- 1:1 Evaluation  10/24/2024      NAME: Darrell Bocanegra : 1950 AGE: 74 y.o.  GENDER: male  CARDIAC REHAB ADMITTING DIAGNOSIS: Chronic systolic (congestive) heart failure [I50.22]    PROBLEM LIST:  Patient Active Problem List   Diagnosis    AC (acromioclavicular) joint bone spurs    Type II or unspecified type diabetes mellitus with neurological manifestations, not stated as uncontrolled(250.60)    Paralysis agitans (HCC)    Rotator cuff tear    Degenerative tear of medial meniscus of right knee         LABS:   No results found for: \"LABA1C\"  No results found for: \"NA\", \"K\", \"CL\", \"CO2\", \"BUN\", \"CREATININE\", \"GLUCOSE\", \"CALCIUM\", \"LABALBU\", \"BILITOT\", \"ALKPHOS\", \"AST\", \"ALT\", \"LABGLOM\", \"GFRAA\", \"AGRATIO\", \"GLOB\"      No results found for: \"CHOL\", \"TRIG\", \"HDL\", \"LDL\", \"VLDL\", \"CHOLHDLRATIO\"      MEDICATIONS/SUPPLEMENTS:   Scheduled Meds:  Continuous Infusions:  PRN Meds:.  Prior to Admission medications    Medication Sig Start Date End Date Taking? Authorizing Provider   furosemide (LASIX) 40 MG tablet Take 0.5 tablets by mouth 24   Blanka Barry MD   glucosamine-chondroitin 500-400 MG CAPS Take by mouth 24   Blanka Barry MD   magnesium oxide (MAG-OX) 400 MG tablet Take 1 tablet by mouth 2 times daily Taking once daily 24   Blanka Barry MD   Multiple Vitamin (MULTI VITAMIN) TABS Take 1 tablet by mouth daily    Blanka Barry MD   nitroGLYCERIN (NITROSTAT) 0.4 MG SL tablet Place 1 tablet under the tongue every 5 minutes as needed 24   Blanka Barry MD   ranolazine (RANEXA) 500 MG extended release tablet Take 1 tablet by mouth 2 times daily    Blanka Barry MD   fluorouracil (EFUDEX) 5 % cream Apply topically as needed Apply topically 2 times daily.    Jhonny MD Blanka   gabapentin (NEURONTIN) 300 MG capsule TAKE 3 CAPSULES BY MOUTH NIGHTLY. 24  Damaris Garcia MD   amantadine

## 2024-10-29 ENCOUNTER — HOSPITAL ENCOUNTER (OUTPATIENT)
Facility: HOSPITAL | Age: 74
Setting detail: RECURRING SERIES
Discharge: HOME | End: 2024-11-01
Payer: MEDICARE

## 2024-10-29 VITALS — WEIGHT: 166 LBS | BODY MASS INDEX: 23.15 KG/M2

## 2024-10-29 PROCEDURE — 93798 PHYS/QHP OP CAR RHAB W/ECG: CPT

## 2024-10-29 ASSESSMENT — EXERCISE STRESS TEST
PEAK_HR: 93
PEAK_METS: 2
PEAK_RPE: 11

## 2024-10-31 ENCOUNTER — HOSPITAL ENCOUNTER (OUTPATIENT)
Facility: HOSPITAL | Age: 74
Setting detail: RECURRING SERIES
End: 2024-10-31
Payer: MEDICARE

## 2024-10-31 VITALS — BODY MASS INDEX: 23.15 KG/M2 | WEIGHT: 166 LBS

## 2024-10-31 PROCEDURE — 93798 PHYS/QHP OP CAR RHAB W/ECG: CPT

## 2024-10-31 ASSESSMENT — EXERCISE STRESS TEST
PEAK_METS: 2
PEAK_HR: 102
PEAK_RPE: 11

## 2024-11-05 ENCOUNTER — HOSPITAL ENCOUNTER (OUTPATIENT)
Facility: HOSPITAL | Age: 74
Setting detail: RECURRING SERIES
Discharge: HOME | End: 2024-11-08
Payer: MEDICARE

## 2024-11-05 VITALS — BODY MASS INDEX: 23.01 KG/M2 | WEIGHT: 165 LBS

## 2024-11-05 PROCEDURE — 93798 PHYS/QHP OP CAR RHAB W/ECG: CPT

## 2024-11-05 ASSESSMENT — EXERCISE STRESS TEST
PEAK_HR: 85
PEAK_RPE: 11
PEAK_METS: 2.9

## 2024-11-07 ENCOUNTER — HOSPITAL ENCOUNTER (OUTPATIENT)
Facility: HOSPITAL | Age: 74
Setting detail: RECURRING SERIES
Discharge: HOME | End: 2024-11-10
Payer: MEDICARE

## 2024-11-07 VITALS — BODY MASS INDEX: 23.29 KG/M2 | WEIGHT: 167 LBS

## 2024-11-07 PROCEDURE — 93798 PHYS/QHP OP CAR RHAB W/ECG: CPT

## 2024-11-07 ASSESSMENT — EXERCISE STRESS TEST
PEAK_METS: 2.9
PEAK_RPE: 11
PEAK_HR: 82

## 2024-11-12 ENCOUNTER — HOSPITAL ENCOUNTER (OUTPATIENT)
Facility: HOSPITAL | Age: 74
Setting detail: RECURRING SERIES
Discharge: HOME | End: 2024-11-15
Payer: MEDICARE

## 2024-11-12 VITALS — BODY MASS INDEX: 23.57 KG/M2 | WEIGHT: 169 LBS

## 2024-11-12 PROCEDURE — 93798 PHYS/QHP OP CAR RHAB W/ECG: CPT

## 2024-11-12 ASSESSMENT — EXERCISE STRESS TEST
PEAK_RPE: 11
PEAK_HR: 84
PEAK_METS: 2.9

## 2024-11-14 ENCOUNTER — HOSPITAL ENCOUNTER (OUTPATIENT)
Facility: HOSPITAL | Age: 74
Setting detail: RECURRING SERIES
Discharge: HOME | End: 2024-11-17
Payer: MEDICARE

## 2024-11-14 VITALS — BODY MASS INDEX: 23.57 KG/M2 | WEIGHT: 169 LBS

## 2024-11-14 PROCEDURE — 93798 PHYS/QHP OP CAR RHAB W/ECG: CPT

## 2024-11-14 ASSESSMENT — EXERCISE STRESS TEST
PEAK_METS: 3
PEAK_HR: 81
PEAK_RPE: 11

## 2024-11-14 ASSESSMENT — NEW YORK HEART ASSOCIATION (NYHA) CLASSIFICATION: NYHA FUNCTIONAL CLASS: CLASS II

## 2024-11-19 ENCOUNTER — HOSPITAL ENCOUNTER (OUTPATIENT)
Facility: HOSPITAL | Age: 74
Setting detail: RECURRING SERIES
Discharge: HOME | End: 2024-11-22
Payer: MEDICARE

## 2024-11-19 VITALS — WEIGHT: 160 LBS | BODY MASS INDEX: 22.32 KG/M2

## 2024-11-19 PROCEDURE — 93798 PHYS/QHP OP CAR RHAB W/ECG: CPT

## 2024-11-19 ASSESSMENT — EXERCISE STRESS TEST
PEAK_RPE: 11
PEAK_HR: 75
PEAK_METS: 3

## 2024-11-21 ENCOUNTER — HOSPITAL ENCOUNTER (OUTPATIENT)
Facility: HOSPITAL | Age: 74
Setting detail: RECURRING SERIES
Discharge: HOME | End: 2024-11-24
Payer: MEDICARE

## 2024-11-21 VITALS — WEIGHT: 166 LBS | BODY MASS INDEX: 23.15 KG/M2

## 2024-11-21 PROCEDURE — 93798 PHYS/QHP OP CAR RHAB W/ECG: CPT

## 2024-11-21 ASSESSMENT — EXERCISE STRESS TEST
PEAK_HR: 81
PEAK_RPE: 12
PEAK_METS: 3

## 2024-11-26 ENCOUNTER — HOSPITAL ENCOUNTER (OUTPATIENT)
Facility: HOSPITAL | Age: 74
Setting detail: RECURRING SERIES
Discharge: HOME | End: 2024-11-29
Payer: MEDICARE

## 2024-11-26 VITALS — BODY MASS INDEX: 22.87 KG/M2 | WEIGHT: 164 LBS

## 2024-11-26 PROCEDURE — 93798 PHYS/QHP OP CAR RHAB W/ECG: CPT

## 2024-11-26 ASSESSMENT — EXERCISE STRESS TEST
PEAK_RPE: 12
PEAK_METS: 3
PEAK_HR: 91

## 2024-12-03 ENCOUNTER — HOSPITAL ENCOUNTER (OUTPATIENT)
Facility: HOSPITAL | Age: 74
Setting detail: RECURRING SERIES
Discharge: HOME | End: 2024-12-06
Payer: MEDICARE

## 2024-12-03 ENCOUNTER — TELEPHONE (OUTPATIENT)
Age: 74
End: 2024-12-03

## 2024-12-03 VITALS — WEIGHT: 165 LBS | BODY MASS INDEX: 23.01 KG/M2

## 2024-12-03 PROCEDURE — 93798 PHYS/QHP OP CAR RHAB W/ECG: CPT

## 2024-12-03 ASSESSMENT — EXERCISE STRESS TEST
PEAK_METS: 3
PEAK_RPE: 12
PEAK_HR: 89

## 2024-12-03 NOTE — TELEPHONE ENCOUNTER
Is calling to inform that progress note fax that was sent is needing provider's signature on page 2.    Please sign and fax back.    f- [phone]

## 2024-12-10 ENCOUNTER — HOSPITAL ENCOUNTER (OUTPATIENT)
Facility: HOSPITAL | Age: 74
Setting detail: RECURRING SERIES
Discharge: HOME | End: 2024-12-13
Payer: MEDICARE

## 2024-12-10 VITALS — BODY MASS INDEX: 23.01 KG/M2 | WEIGHT: 165 LBS

## 2024-12-10 PROCEDURE — 93798 PHYS/QHP OP CAR RHAB W/ECG: CPT

## 2024-12-10 ASSESSMENT — EXERCISE STRESS TEST
PEAK_RPE: 12
PEAK_HR: 93
PEAK_METS: 3

## 2024-12-10 ASSESSMENT — NEW YORK HEART ASSOCIATION (NYHA) CLASSIFICATION: NYHA FUNCTIONAL CLASS: CLASS II

## 2024-12-12 ENCOUNTER — HOSPITAL ENCOUNTER (OUTPATIENT)
Facility: HOSPITAL | Age: 74
Setting detail: RECURRING SERIES
Discharge: HOME | End: 2024-12-15
Payer: MEDICARE

## 2024-12-12 VITALS — WEIGHT: 168 LBS | BODY MASS INDEX: 23.43 KG/M2

## 2024-12-12 PROCEDURE — 93798 PHYS/QHP OP CAR RHAB W/ECG: CPT

## 2024-12-12 ASSESSMENT — EXERCISE STRESS TEST
PEAK_RPE: 12
PEAK_HR: 83
PEAK_METS: 2.9

## 2024-12-17 ENCOUNTER — HOSPITAL ENCOUNTER (OUTPATIENT)
Facility: HOSPITAL | Age: 74
Setting detail: RECURRING SERIES
Discharge: HOME | End: 2024-12-20
Payer: MEDICARE

## 2024-12-17 VITALS — WEIGHT: 163 LBS | BODY MASS INDEX: 22.73 KG/M2

## 2024-12-17 PROCEDURE — 93798 PHYS/QHP OP CAR RHAB W/ECG: CPT

## 2024-12-17 ASSESSMENT — EXERCISE STRESS TEST
PEAK_METS: 2.9
PEAK_HR: 98
PEAK_RPE: 12

## 2024-12-19 ENCOUNTER — HOSPITAL ENCOUNTER (OUTPATIENT)
Facility: HOSPITAL | Age: 74
Setting detail: RECURRING SERIES
Discharge: HOME | End: 2024-12-22
Payer: MEDICARE

## 2024-12-19 VITALS — BODY MASS INDEX: 22.93 KG/M2 | WEIGHT: 164.4 LBS

## 2024-12-19 PROCEDURE — 93798 PHYS/QHP OP CAR RHAB W/ECG: CPT

## 2024-12-19 PROCEDURE — 93797 PHYS/QHP OP CAR RHAB WO ECG: CPT

## 2024-12-19 ASSESSMENT — EXERCISE STRESS TEST
PEAK_METS: 2.9
PEAK_HR: 85
PEAK_RPE: 12

## 2024-12-26 ENCOUNTER — HOSPITAL ENCOUNTER (OUTPATIENT)
Facility: HOSPITAL | Age: 74
Setting detail: RECURRING SERIES
Discharge: HOME | End: 2024-12-29
Payer: MEDICARE

## 2024-12-26 VITALS — BODY MASS INDEX: 23.15 KG/M2 | WEIGHT: 166 LBS

## 2024-12-26 PROCEDURE — 93797 PHYS/QHP OP CAR RHAB WO ECG: CPT

## 2024-12-26 ASSESSMENT — EXERCISE STRESS TEST
PEAK_RPE: 12
PEAK_METS: 2.9
PEAK_HR: 74

## 2024-12-31 ENCOUNTER — HOSPITAL ENCOUNTER (OUTPATIENT)
Facility: HOSPITAL | Age: 74
Setting detail: RECURRING SERIES
Discharge: HOME | End: 2025-01-03
Payer: MEDICARE

## 2024-12-31 VITALS — BODY MASS INDEX: 22.73 KG/M2 | WEIGHT: 163 LBS

## 2024-12-31 PROCEDURE — 93798 PHYS/QHP OP CAR RHAB W/ECG: CPT

## 2024-12-31 ASSESSMENT — EXERCISE STRESS TEST
PEAK_HR: 98
PEAK_METS: 3.3
PEAK_RPE: 12

## 2025-01-02 ENCOUNTER — HOSPITAL ENCOUNTER (OUTPATIENT)
Facility: HOSPITAL | Age: 75
Setting detail: RECURRING SERIES
Discharge: HOME | End: 2025-01-05
Payer: MEDICARE

## 2025-01-02 VITALS — WEIGHT: 166 LBS | BODY MASS INDEX: 23.15 KG/M2

## 2025-01-02 PROCEDURE — 93798 PHYS/QHP OP CAR RHAB W/ECG: CPT

## 2025-01-02 PROCEDURE — 93797 PHYS/QHP OP CAR RHAB WO ECG: CPT

## 2025-01-02 ASSESSMENT — EXERCISE STRESS TEST
PEAK_RPE: 12
PEAK_METS: 3.3
PEAK_HR: 84

## 2025-01-07 ENCOUNTER — HOSPITAL ENCOUNTER (OUTPATIENT)
Facility: HOSPITAL | Age: 75
Setting detail: RECURRING SERIES
Discharge: HOME | End: 2025-01-10
Payer: MEDICARE

## 2025-01-07 VITALS — WEIGHT: 162 LBS | BODY MASS INDEX: 22.59 KG/M2

## 2025-01-07 PROCEDURE — 93798 PHYS/QHP OP CAR RHAB W/ECG: CPT

## 2025-01-07 ASSESSMENT — EXERCISE STRESS TEST
PEAK_HR: 103
PEAK_RPE: 12
PEAK_METS: 3.3

## 2025-01-09 ENCOUNTER — HOSPITAL ENCOUNTER (OUTPATIENT)
Facility: HOSPITAL | Age: 75
Setting detail: RECURRING SERIES
Discharge: HOME | End: 2025-01-12
Payer: MEDICARE

## 2025-01-09 VITALS — WEIGHT: 164 LBS | BODY MASS INDEX: 22.87 KG/M2

## 2025-01-09 PROCEDURE — 93798 PHYS/QHP OP CAR RHAB W/ECG: CPT

## 2025-01-09 ASSESSMENT — NEW YORK HEART ASSOCIATION (NYHA) CLASSIFICATION: NYHA FUNCTIONAL CLASS: CLASS II

## 2025-01-09 ASSESSMENT — EXERCISE STRESS TEST
PEAK_HR: 96
PEAK_METS: 3.3
PEAK_RPE: 12

## 2025-01-16 ENCOUNTER — HOSPITAL ENCOUNTER (OUTPATIENT)
Facility: HOSPITAL | Age: 75
Setting detail: RECURRING SERIES
Discharge: HOME | End: 2025-01-19
Payer: MEDICARE

## 2025-01-16 VITALS — WEIGHT: 162 LBS | BODY MASS INDEX: 22.59 KG/M2

## 2025-01-16 PROCEDURE — 93798 PHYS/QHP OP CAR RHAB W/ECG: CPT

## 2025-01-16 ASSESSMENT — EXERCISE STRESS TEST
PEAK_RPE: 12
PEAK_METS: 3.4
PEAK_HR: 118

## 2025-01-21 ENCOUNTER — HOSPITAL ENCOUNTER (OUTPATIENT)
Facility: HOSPITAL | Age: 75
Setting detail: RECURRING SERIES
Discharge: HOME | End: 2025-01-24
Payer: MEDICARE

## 2025-01-21 VITALS — WEIGHT: 163 LBS | BODY MASS INDEX: 22.73 KG/M2

## 2025-01-21 PROCEDURE — 93798 PHYS/QHP OP CAR RHAB W/ECG: CPT

## 2025-01-21 ASSESSMENT — EXERCISE STRESS TEST
PEAK_RPE: 12
PEAK_HR: 112
PEAK_METS: 3.4

## 2025-01-23 ENCOUNTER — HOSPITAL ENCOUNTER (OUTPATIENT)
Facility: HOSPITAL | Age: 75
Setting detail: RECURRING SERIES
Discharge: HOME | End: 2025-01-26
Payer: MEDICARE

## 2025-01-23 VITALS — BODY MASS INDEX: 22.37 KG/M2 | WEIGHT: 160.4 LBS

## 2025-01-23 PROCEDURE — 93798 PHYS/QHP OP CAR RHAB W/ECG: CPT

## 2025-01-23 ASSESSMENT — EXERCISE STRESS TEST
PEAK_HR: 122
PEAK_METS: 3.4
PEAK_RPE: 12

## 2025-01-28 ENCOUNTER — HOSPITAL ENCOUNTER (OUTPATIENT)
Facility: HOSPITAL | Age: 75
Setting detail: RECURRING SERIES
Discharge: HOME | End: 2025-01-31
Payer: MEDICARE

## 2025-01-28 VITALS — BODY MASS INDEX: 22.59 KG/M2 | WEIGHT: 162 LBS

## 2025-01-28 PROCEDURE — 93798 PHYS/QHP OP CAR RHAB W/ECG: CPT

## 2025-01-28 ASSESSMENT — EXERCISE STRESS TEST
PEAK_HR: 112
PEAK_RPE: 12
PEAK_METS: 3.4

## 2025-01-30 ENCOUNTER — HOSPITAL ENCOUNTER (OUTPATIENT)
Facility: HOSPITAL | Age: 75
Setting detail: RECURRING SERIES
End: 2025-01-30
Payer: MEDICARE

## 2025-01-30 ASSESSMENT — NEW YORK HEART ASSOCIATION (NYHA) CLASSIFICATION: NYHA FUNCTIONAL CLASS: CLASS II

## 2025-02-04 ENCOUNTER — HOSPITAL ENCOUNTER (OUTPATIENT)
Facility: HOSPITAL | Age: 75
Setting detail: RECURRING SERIES
Discharge: HOME | End: 2025-02-07
Payer: MEDICARE

## 2025-02-04 VITALS — WEIGHT: 162 LBS | BODY MASS INDEX: 22.59 KG/M2

## 2025-02-04 PROCEDURE — 93798 PHYS/QHP OP CAR RHAB W/ECG: CPT

## 2025-02-04 ASSESSMENT — PATIENT HEALTH QUESTIONNAIRE - PHQ9
3. TROUBLE FALLING OR STAYING ASLEEP: NOT AT ALL
10. IF YOU CHECKED OFF ANY PROBLEMS, HOW DIFFICULT HAVE THESE PROBLEMS MADE IT FOR YOU TO DO YOUR WORK, TAKE CARE OF THINGS AT HOME, OR GET ALONG WITH OTHER PEOPLE: NOT DIFFICULT AT ALL
4. FEELING TIRED OR HAVING LITTLE ENERGY: NOT AT ALL
5. POOR APPETITE OR OVEREATING: NOT AT ALL
8. MOVING OR SPEAKING SO SLOWLY THAT OTHER PEOPLE COULD HAVE NOTICED. OR THE OPPOSITE, BEING SO FIGETY OR RESTLESS THAT YOU HAVE BEEN MOVING AROUND A LOT MORE THAN USUAL: NOT AT ALL
SUM OF ALL RESPONSES TO PHQ QUESTIONS 1-9: 0
SUM OF ALL RESPONSES TO PHQ QUESTIONS 1-9: 0
9. THOUGHTS THAT YOU WOULD BE BETTER OFF DEAD, OR OF HURTING YOURSELF: NOT AT ALL
6. FEELING BAD ABOUT YOURSELF - OR THAT YOU ARE A FAILURE OR HAVE LET YOURSELF OR YOUR FAMILY DOWN: NOT AT ALL
SUM OF ALL RESPONSES TO PHQ9 QUESTIONS 1 & 2: 0
1. LITTLE INTEREST OR PLEASURE IN DOING THINGS: NOT AT ALL
2. FEELING DOWN, DEPRESSED OR HOPELESS: NOT AT ALL
7. TROUBLE CONCENTRATING ON THINGS, SUCH AS READING THE NEWSPAPER OR WATCHING TELEVISION: NOT AT ALL
SUM OF ALL RESPONSES TO PHQ QUESTIONS 1-9: 0
SUM OF ALL RESPONSES TO PHQ QUESTIONS 1-9: 0

## 2025-02-04 ASSESSMENT — EXERCISE STRESS TEST
PEAK_RPE: 12
PEAK_HR: 116
PEAK_METS: 3.4

## 2025-02-06 ENCOUNTER — HOSPITAL ENCOUNTER (OUTPATIENT)
Facility: HOSPITAL | Age: 75
Setting detail: RECURRING SERIES
Discharge: HOME | End: 2025-02-09
Payer: MEDICARE

## 2025-02-06 VITALS — BODY MASS INDEX: 23.01 KG/M2 | WEIGHT: 165 LBS

## 2025-02-06 PROCEDURE — 93798 PHYS/QHP OP CAR RHAB W/ECG: CPT

## 2025-02-06 ASSESSMENT — EXERCISE STRESS TEST
PEAK_RPE: 12
PEAK_METS: 3.4
PEAK_HR: 116

## 2025-02-11 ENCOUNTER — APPOINTMENT (OUTPATIENT)
Facility: HOSPITAL | Age: 75
End: 2025-02-11
Payer: MEDICARE

## 2025-02-13 ENCOUNTER — HOSPITAL ENCOUNTER (OUTPATIENT)
Facility: HOSPITAL | Age: 75
Setting detail: RECURRING SERIES
Discharge: HOME | End: 2025-02-16
Payer: MEDICARE

## 2025-02-13 VITALS — BODY MASS INDEX: 23.15 KG/M2 | WEIGHT: 166 LBS

## 2025-02-13 PROCEDURE — 93798 PHYS/QHP OP CAR RHAB W/ECG: CPT

## 2025-02-13 ASSESSMENT — EXERCISE STRESS TEST
PEAK_METS: 3.4
PEAK_HR: 104
PEAK_RPE: 12

## 2025-02-13 NOTE — CARDIO/PULMONARY
DISCHARGE SUMMARY NOTE  2025      NAME: Darrell Bocanegra : 1950 AGE: 74 y.o.  GENDER: male    CARDIAC REHAB ADMITTING DIAGNOSIS: Chronic systolic (congestive) heart failure [I50.22]    REFERRING PHYSICIAN: Marilyn Ramirez*    MEDICAL HX:  Past Medical History:   Diagnosis Date    Arthritis     CAD (coronary artery disease)     MI , PCI LAD ,     Cancer ([facility]) 10/1/2021    Prostate cancer    Cerebral artery occlusion with cerebral infarction ([facility])     Blood clot in eye    Chronic pain     joints    Clot     to right eye    Diabetes ([facility])     GERD (gastroesophageal reflux disease)     Heart failure (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)     Right calf DVT    Tremor 2013    Hand tremors         ANTHROPOMETRICS:      Ht Readings from Last 1 Encounters:   10/16/24 1.803 m (5' 11\")      Wt Readings from Last 1 Encounters:   25 75.3 kg (166 lb)        WAIST: 34         PROGRAM SUMMARY:    Darrell Bocanegra completed 36/36 sessions in the Cardiac Rehab program. He will continue exercising and focus on diet and nutrition at home. He attended 5 classes and is aware of his cardiac risk factors and cardiac medications. Weight loss was 5 lbs. MET level increase from 2 to 3.4.      Questions addressed. Discharge plans discussed. Darrell Bocanegra verbalized understanding.      Faina Corbin RN

## 2025-02-18 ENCOUNTER — APPOINTMENT (OUTPATIENT)
Facility: HOSPITAL | Age: 75
End: 2025-02-18
Payer: MEDICARE

## 2025-02-20 ENCOUNTER — APPOINTMENT (OUTPATIENT)
Facility: HOSPITAL | Age: 75
End: 2025-02-20
Payer: MEDICARE

## 2025-02-21 RX ORDER — CARBIDOPA AND LEVODOPA 25; 100 MG/1; MG/1
2 TABLET ORAL 4 TIMES DAILY
Qty: 720 TABLET | Refills: 0 | Status: SHIPPED | OUTPATIENT
Start: 2025-02-21

## 2025-02-23 DIAGNOSIS — G20.A1 PARKINSON'S DISEASE WITHOUT DYSKINESIA OR FLUCTUATING MANIFESTATIONS (HCC): ICD-10-CM

## 2025-02-24 RX ORDER — NORTRIPTYLINE HYDROCHLORIDE 10 MG/1
CAPSULE ORAL
Qty: 90 CAPSULE | Refills: 0 | Status: SHIPPED | OUTPATIENT
Start: 2025-02-24

## 2025-02-24 RX ORDER — AMANTADINE HYDROCHLORIDE 100 MG/1
CAPSULE, GELATIN COATED ORAL
Qty: 180 CAPSULE | Refills: 0 | Status: SHIPPED | OUTPATIENT
Start: 2025-02-24

## 2025-02-25 ENCOUNTER — APPOINTMENT (OUTPATIENT)
Facility: HOSPITAL | Age: 75
End: 2025-02-25
Payer: MEDICARE

## 2025-02-27 ENCOUNTER — APPOINTMENT (OUTPATIENT)
Facility: HOSPITAL | Age: 75
End: 2025-02-27
Payer: MEDICARE

## 2025-03-16 DIAGNOSIS — M79.2 NEURALGIA AND NEURITIS, UNSPECIFIED: ICD-10-CM

## 2025-03-16 DIAGNOSIS — E11.42 DIABETIC POLYNEUROPATHY ASSOCIATED WITH TYPE 2 DIABETES MELLITUS: ICD-10-CM

## 2025-03-17 RX ORDER — GABAPENTIN 300 MG/1
CAPSULE ORAL
Qty: 270 CAPSULE | Refills: 1 | Status: SHIPPED | OUTPATIENT
Start: 2025-03-17 | End: 2027-03-24

## 2025-03-18 RX ORDER — NORTRIPTYLINE HYDROCHLORIDE 10 MG/1
CAPSULE ORAL
Qty: 90 CAPSULE | Refills: 1 | OUTPATIENT
Start: 2025-03-18

## 2025-03-19 RX ORDER — CARBIDOPA AND LEVODOPA 25; 100 MG/1; MG/1
2 TABLET ORAL 4 TIMES DAILY
Qty: 720 TABLET | Refills: 0 | OUTPATIENT
Start: 2025-03-19

## 2025-03-20 ENCOUNTER — OFFICE VISIT (OUTPATIENT)
Age: 75
End: 2025-03-20
Payer: MEDICARE

## 2025-03-20 VITALS
WEIGHT: 160 LBS | HEART RATE: 83 BPM | HEIGHT: 71 IN | BODY MASS INDEX: 22.4 KG/M2 | RESPIRATION RATE: 16 BRPM | SYSTOLIC BLOOD PRESSURE: 140 MMHG | DIASTOLIC BLOOD PRESSURE: 75 MMHG | OXYGEN SATURATION: 96 %

## 2025-03-20 DIAGNOSIS — E11.42 DIABETIC POLYNEUROPATHY ASSOCIATED WITH TYPE 2 DIABETES MELLITUS: ICD-10-CM

## 2025-03-20 DIAGNOSIS — G20.A1 PARKINSON'S DISEASE WITHOUT DYSKINESIA OR FLUCTUATING MANIFESTATIONS (HCC): Primary | ICD-10-CM

## 2025-03-20 PROCEDURE — 99214 OFFICE O/P EST MOD 30 MIN: CPT | Performed by: PSYCHIATRY & NEUROLOGY

## 2025-03-20 PROCEDURE — 1159F MED LIST DOCD IN RCRD: CPT | Performed by: PSYCHIATRY & NEUROLOGY

## 2025-03-20 PROCEDURE — 1123F ACP DISCUSS/DSCN MKR DOCD: CPT | Performed by: PSYCHIATRY & NEUROLOGY

## 2025-03-20 PROCEDURE — G8420 CALC BMI NORM PARAMETERS: HCPCS | Performed by: PSYCHIATRY & NEUROLOGY

## 2025-03-20 PROCEDURE — 1126F AMNT PAIN NOTED NONE PRSNT: CPT | Performed by: PSYCHIATRY & NEUROLOGY

## 2025-03-20 PROCEDURE — G8427 DOCREV CUR MEDS BY ELIG CLIN: HCPCS | Performed by: PSYCHIATRY & NEUROLOGY

## 2025-03-20 PROCEDURE — 1036F TOBACCO NON-USER: CPT | Performed by: PSYCHIATRY & NEUROLOGY

## 2025-03-20 RX ORDER — CARBIDOPA AND LEVODOPA 25; 100 MG/1; MG/1
2 TABLET ORAL 3 TIMES DAILY
Qty: 720 TABLET | Refills: 0 | Status: SHIPPED | OUTPATIENT
Start: 2025-03-20

## 2025-03-20 RX ORDER — NORTRIPTYLINE HYDROCHLORIDE 10 MG/1
CAPSULE ORAL
Qty: 90 CAPSULE | Refills: 3 | Status: SHIPPED | OUTPATIENT
Start: 2025-03-20

## 2025-03-20 NOTE — PROGRESS NOTES
(FLONASE) 50 MCG/ACT nasal spray 2 sprays by Nasal route daily      metFORMIN (GLUCOPHAGE) 500 MG tablet Take 2 tablets by mouth 2 times daily (with meals)      metoprolol succinate (TOPROL XL) 50 MG extended release tablet Take 1 tablet by mouth daily Pt reports taking 1/2 tablet daily      rivaroxaban (XARELTO) 20 MG TABS tablet 1 tablet Daily with supper       No current facility-administered medications for this visit.      Allergies   Allergen Reactions    Amantadines Swelling     Reports Lip, and hand swelling     Social History     Tobacco Use    Smoking status: Former     Current packs/day: 0.00     Average packs/day: 0.3 packs/day for 15.0 years (3.7 ttl pk-yrs)     Types: Cigarettes     Start date: 1966     Quit date: 1981     Years since quittin.2    Smokeless tobacco: Never   Vaping Use    Vaping status: Never Used   Substance Use Topics    Alcohol use: Yes     Alcohol/week: 2.0 standard drinks of alcohol     Types: 2 Shots of liquor per week    Drug use: Not Currently     Types: Marijuana (Weed)     Comment: while in [place]       History of Present Illness  74-year-old gentleman following up for Parkinson's and peripheral neuropathy.  He was previously under the care of Dr. Edgar Myers prior to his custodial.  He is on Sinemet Pamelor and Neurontin.  Last visit we sent him to [facility] program.  We also had him on some amantadine.  That caused side effects of swelling and he discontinued that.  He is using 2 tablets of Sinemet 3 times daily not 4 times daily.  Symptoms are controlled.  He thinks the [facility] program was helpful.  He ran out of Neurontin due to a pharmacy issue and started getting the shooting pains in his feet and now that he is back on it the symptoms resolved.    Diagnostics  EMG dated 2024 demonstrating generalized sensory polyneuropathy as well as right common peroneal neuropathy at the knee    Laboratory analysis  All 2024  Comprehensive metabolic profile

## 2025-08-14 RX ORDER — CARBIDOPA AND LEVODOPA 25; 100 MG/1; MG/1
TABLET ORAL
Qty: 720 TABLET | Refills: 0 | Status: SHIPPED | OUTPATIENT
Start: 2025-08-14